# Patient Record
Sex: FEMALE | Race: WHITE | Employment: FULL TIME | URBAN - NONMETROPOLITAN AREA
[De-identification: names, ages, dates, MRNs, and addresses within clinical notes are randomized per-mention and may not be internally consistent; named-entity substitution may affect disease eponyms.]

---

## 2019-09-23 ENCOUNTER — OFFICE VISIT (OUTPATIENT)
Dept: ORTHOPEDIC SURGERY | Age: 44
End: 2019-09-23
Payer: COMMERCIAL

## 2019-09-23 VITALS
BODY MASS INDEX: 40.59 KG/M2 | HEART RATE: 79 BPM | DIASTOLIC BLOOD PRESSURE: 126 MMHG | HEIGHT: 61 IN | SYSTOLIC BLOOD PRESSURE: 192 MMHG | WEIGHT: 215 LBS

## 2019-09-23 DIAGNOSIS — M17.12 PRIMARY OSTEOARTHRITIS OF LEFT KNEE: ICD-10-CM

## 2019-09-23 DIAGNOSIS — M23.204 DEGENERATIVE TEAR OF MEDIAL MENISCUS OF LEFT KNEE: ICD-10-CM

## 2019-09-23 DIAGNOSIS — M25.561 ACUTE PAIN OF RIGHT KNEE: Primary | ICD-10-CM

## 2019-09-23 PROCEDURE — 20610 DRAIN/INJ JOINT/BURSA W/O US: CPT | Performed by: ORTHOPAEDIC SURGERY

## 2019-09-23 PROCEDURE — 99243 OFF/OP CNSLTJ NEW/EST LOW 30: CPT | Performed by: ORTHOPAEDIC SURGERY

## 2019-09-23 RX ORDER — METHYLPREDNISOLONE 4 MG/1
TABLET ORAL
Qty: 1 KIT | Refills: 0 | Status: SHIPPED | OUTPATIENT
Start: 2019-09-23 | End: 2019-09-29

## 2019-09-23 NOTE — LETTER
Range of Motion Right Left   Extension 0 -5   Flexion 120 105     Provocative Test Right Left    Positive Negative Positive Negative   Anterior drawer [] [x] [] [x]   Lachman [] [x] [] [x]   Posterior drawer [] [x] [] [x]   Varus testing [] [x] [x] []   Valgus testing [] [x] [x] []   Joint line tenderness [] [x] [x] []     Additional Exam Comments: Although her neurocirculatory lymphatic exam is normal symmetric to both lower extremities, she does have discomfort in the left leg mostly in the medial compartment with range of motion and stress. She has some crepitus in the knee and some reproduction of pain with catching and locking subjectively realized      IMAGING STUDIES    X-ray 2 views of her left knee reveals grade 2 osteoarthritis    IMPRESSION    Left knee pain secondary to osteoarthritis and medial meniscus tear degenerative    PLAN      1. Conservative care options including physical therapy, NSAIDs, bracing, and activity modification were discussed. 2.  The indications for therapeutic injections were discussed. 3.  The indications for additional imaging studies were discussed. 4.  After considering the various options discussed, the patient elected to pursue a course that includes cortisone injection left knee, Medrol Dosepak, physician directed physical therapy program, and if not substantially improved over the next 2 weeks consider scanning. She should remain off work for the next 2 weeks    Recommendation is for a cortisone injection into the left knee. After informed consent was received from the patient, the left knee was injected with 1 mL( 40mg) Depo-Medrol and 4 mL  of 0.25% Marcaine in the syringe from an anterolateral joint line approach, using a 25-gauge needle, under sterile Betadine prep, using ethyl chloride as a topical refrigerant, for a diagnosis of osteoarthritis. The patient appeared to tolerate it well. The patient should return here periodically as needed.

## 2019-09-23 NOTE — PROGRESS NOTES
Substance and Sexual Activity    Alcohol use: Not on file    Drug use: Not on file    Sexual activity: Not on file   Lifestyle    Physical activity:     Days per week: Not on file     Minutes per session: Not on file    Stress: Not on file   Relationships    Social connections:     Talks on phone: Not on file     Gets together: Not on file     Attends Baptism service: Not on file     Active member of club or organization: Not on file     Attends meetings of clubs or organizations: Not on file     Relationship status: Not on file    Intimate partner violence:     Fear of current or ex partner: Not on file     Emotionally abused: Not on file     Physically abused: Not on file     Forced sexual activity: Not on file   Other Topics Concern    Not on file   Social History Narrative    Not on file       Family HISTORY    No family history on file. PHYSICAL EXAM    Vital Signs:  BP (!) 192/126   Pulse 79   Ht 5' 1\" (1.549 m)   Wt 215 lb (97.5 kg)   BMI 40.62 kg/m²   General Appearance: Obese body habitus with a BMI of 41. Alert and oriented to person, place, and time. Affect:  Normal.   Gait: Antalgic favoring the left leg. Good balance and coordination. Skin:  Intact. Sensation:  Intact. Strength: Limited by pain  Reflexes:  Intact. Pulses:  Intact. Knee Exam:    Effusion: Negative to trace    Range of Motion Right Left   Extension 0 -5   Flexion 120 105     Provocative Test Right Left    Positive Negative Positive Negative   Anterior drawer [] [x] [] [x]   Lachman [] [x] [] [x]   Posterior drawer [] [x] [] [x]   Varus testing [] [x] [x] []   Valgus testing [] [x] [x] []   Joint line tenderness [] [x] [x] []     Additional Exam Comments: Although her neurocirculatory lymphatic exam is normal symmetric to both lower extremities, she does have discomfort in the left leg mostly in the medial compartment with range of motion and stress.   She has some crepitus in the knee and some reproduction

## 2019-10-04 ENCOUNTER — OFFICE VISIT (OUTPATIENT)
Dept: ORTHOPEDIC SURGERY | Age: 44
End: 2019-10-04
Payer: COMMERCIAL

## 2019-10-04 VITALS
HEIGHT: 61 IN | SYSTOLIC BLOOD PRESSURE: 159 MMHG | BODY MASS INDEX: 39.65 KG/M2 | WEIGHT: 210 LBS | HEART RATE: 62 BPM | DIASTOLIC BLOOD PRESSURE: 89 MMHG

## 2019-10-04 DIAGNOSIS — M17.12 PRIMARY OSTEOARTHRITIS OF LEFT KNEE: ICD-10-CM

## 2019-10-04 DIAGNOSIS — M23.204 DEGENERATIVE TEAR OF MEDIAL MENISCUS OF LEFT KNEE: Primary | ICD-10-CM

## 2019-10-04 PROCEDURE — 99213 OFFICE O/P EST LOW 20 MIN: CPT | Performed by: ORTHOPAEDIC SURGERY

## 2019-10-30 ENCOUNTER — OFFICE VISIT (OUTPATIENT)
Dept: ORTHOPEDIC SURGERY | Age: 44
End: 2019-10-30
Payer: COMMERCIAL

## 2019-10-30 DIAGNOSIS — M17.12 PRIMARY OSTEOARTHRITIS OF LEFT KNEE: Primary | ICD-10-CM

## 2019-10-30 DIAGNOSIS — M23.52: ICD-10-CM

## 2019-10-30 DIAGNOSIS — M22.42 CHONDROMALACIA PATELLAE OF LEFT KNEE: ICD-10-CM

## 2019-10-30 PROCEDURE — 99213 OFFICE O/P EST LOW 20 MIN: CPT | Performed by: ORTHOPAEDIC SURGERY

## 2019-10-31 ENCOUNTER — TELEPHONE (OUTPATIENT)
Dept: ORTHOPEDIC SURGERY | Age: 44
End: 2019-10-31

## 2019-11-15 ENCOUNTER — TELEPHONE (OUTPATIENT)
Dept: ORTHOPEDIC SURGERY | Age: 44
End: 2019-11-15

## 2019-11-21 ENCOUNTER — ANESTHESIA EVENT (OUTPATIENT)
Dept: OPERATING ROOM | Age: 44
End: 2019-11-21
Payer: COMMERCIAL

## 2019-11-22 ENCOUNTER — HOSPITAL ENCOUNTER (OUTPATIENT)
Age: 44
Setting detail: OUTPATIENT SURGERY
Discharge: HOME | End: 2019-11-22
Attending: ORTHOPAEDIC SURGERY | Admitting: ORTHOPAEDIC SURGERY
Payer: COMMERCIAL

## 2019-11-22 ENCOUNTER — ANESTHESIA (OUTPATIENT)
Dept: OPERATING ROOM | Age: 44
End: 2019-11-22
Payer: COMMERCIAL

## 2019-11-22 VITALS
HEART RATE: 70 BPM | BODY MASS INDEX: 40.48 KG/M2 | OXYGEN SATURATION: 98 % | RESPIRATION RATE: 14 BRPM | SYSTOLIC BLOOD PRESSURE: 170 MMHG | DIASTOLIC BLOOD PRESSURE: 85 MMHG | TEMPERATURE: 97.3 F | HEIGHT: 62 IN | WEIGHT: 220 LBS

## 2019-11-22 VITALS
DIASTOLIC BLOOD PRESSURE: 63 MMHG | SYSTOLIC BLOOD PRESSURE: 124 MMHG | OXYGEN SATURATION: 100 % | RESPIRATION RATE: 14 BRPM

## 2019-11-22 DIAGNOSIS — M25.561 ACUTE PAIN OF RIGHT KNEE: Primary | ICD-10-CM

## 2019-11-22 DIAGNOSIS — M22.42 CHONDROMALACIA PATELLAE OF LEFT KNEE: ICD-10-CM

## 2019-11-22 DIAGNOSIS — M23.52: ICD-10-CM

## 2019-11-22 DIAGNOSIS — M17.12 PRIMARY OSTEOARTHRITIS OF LEFT KNEE: ICD-10-CM

## 2019-11-22 PROCEDURE — 2709999900 HC NON-CHARGEABLE SUPPLY: Performed by: ORTHOPAEDIC SURGERY

## 2019-11-22 PROCEDURE — 6360000002 HC RX W HCPCS: Performed by: ORTHOPAEDIC SURGERY

## 2019-11-22 PROCEDURE — 2500000003 HC RX 250 WO HCPCS: Performed by: ORTHOPAEDIC SURGERY

## 2019-11-22 PROCEDURE — 2580000003 HC RX 258: Performed by: ANESTHESIOLOGY

## 2019-11-22 PROCEDURE — 2500000003 HC RX 250 WO HCPCS: Performed by: ANESTHESIOLOGY

## 2019-11-22 PROCEDURE — 3700000000 HC ANESTHESIA ATTENDED CARE: Performed by: ORTHOPAEDIC SURGERY

## 2019-11-22 PROCEDURE — 7100000001 HC PACU RECOVERY - ADDTL 15 MIN: Performed by: ORTHOPAEDIC SURGERY

## 2019-11-22 PROCEDURE — 7100000000 HC PACU RECOVERY - FIRST 15 MIN: Performed by: ORTHOPAEDIC SURGERY

## 2019-11-22 PROCEDURE — 7100000010 HC PHASE II RECOVERY - FIRST 15 MIN: Performed by: ORTHOPAEDIC SURGERY

## 2019-11-22 PROCEDURE — 6360000002 HC RX W HCPCS: Performed by: NURSE ANESTHETIST, CERTIFIED REGISTERED

## 2019-11-22 PROCEDURE — 3600000004 HC SURGERY LEVEL 4 BASE: Performed by: ORTHOPAEDIC SURGERY

## 2019-11-22 PROCEDURE — 3600000014 HC SURGERY LEVEL 4 ADDTL 15MIN: Performed by: ORTHOPAEDIC SURGERY

## 2019-11-22 PROCEDURE — 2500000003 HC RX 250 WO HCPCS: Performed by: NURSE ANESTHETIST, CERTIFIED REGISTERED

## 2019-11-22 PROCEDURE — 3700000001 HC ADD 15 MINUTES (ANESTHESIA): Performed by: ORTHOPAEDIC SURGERY

## 2019-11-22 PROCEDURE — 6360000002 HC RX W HCPCS: Performed by: ANESTHESIOLOGY

## 2019-11-22 PROCEDURE — 7100000011 HC PHASE II RECOVERY - ADDTL 15 MIN: Performed by: ORTHOPAEDIC SURGERY

## 2019-11-22 RX ORDER — LIDOCAINE HYDROCHLORIDE 10 MG/ML
0.3 INJECTION, SOLUTION EPIDURAL; INFILTRATION; INTRACAUDAL; PERINEURAL
Status: COMPLETED | OUTPATIENT
Start: 2019-11-22 | End: 2019-11-22

## 2019-11-22 RX ORDER — OXYCODONE HYDROCHLORIDE AND ACETAMINOPHEN 5; 325 MG/1; MG/1
2 TABLET ORAL PRN
Status: DISCONTINUED | OUTPATIENT
Start: 2019-11-22 | End: 2019-11-22 | Stop reason: HOSPADM

## 2019-11-22 RX ORDER — BUPIVACAINE HYDROCHLORIDE 2.5 MG/ML
INJECTION, SOLUTION INFILTRATION; PERINEURAL PRN
Status: DISCONTINUED | OUTPATIENT
Start: 2019-11-22 | End: 2019-11-22 | Stop reason: ALTCHOICE

## 2019-11-22 RX ORDER — SODIUM CHLORIDE 0.9 % (FLUSH) 0.9 %
10 SYRINGE (ML) INJECTION PRN
Status: DISCONTINUED | OUTPATIENT
Start: 2019-11-22 | End: 2019-11-22 | Stop reason: HOSPADM

## 2019-11-22 RX ORDER — HYDROCODONE BITARTRATE AND ACETAMINOPHEN 5; 325 MG/1; MG/1
1 TABLET ORAL EVERY 6 HOURS PRN
Qty: 28 TABLET | Refills: 0 | Status: SHIPPED | OUTPATIENT
Start: 2019-11-22 | End: 2019-11-29

## 2019-11-22 RX ORDER — HYDROMORPHONE HCL 110MG/55ML
PATIENT CONTROLLED ANALGESIA SYRINGE INTRAVENOUS PRN
Status: DISCONTINUED | OUTPATIENT
Start: 2019-11-22 | End: 2019-11-22 | Stop reason: SDUPTHER

## 2019-11-22 RX ORDER — BUPIVACAINE HYDROCHLORIDE 2.5 MG/ML
INJECTION, SOLUTION EPIDURAL; INFILTRATION; INTRACAUDAL
Status: DISCONTINUED
Start: 2019-11-22 | End: 2019-11-22 | Stop reason: HOSPADM

## 2019-11-22 RX ORDER — ACETAMINOPHEN 10 MG/ML
1000 INJECTION, SOLUTION INTRAVENOUS ONCE
Status: COMPLETED | OUTPATIENT
Start: 2019-11-22 | End: 2019-11-22

## 2019-11-22 RX ORDER — SODIUM CHLORIDE 0.9 % (FLUSH) 0.9 %
10 SYRINGE (ML) INJECTION EVERY 12 HOURS SCHEDULED
Status: DISCONTINUED | OUTPATIENT
Start: 2019-11-22 | End: 2019-11-22 | Stop reason: HOSPADM

## 2019-11-22 RX ORDER — LABETALOL HYDROCHLORIDE 5 MG/ML
5 INJECTION, SOLUTION INTRAVENOUS EVERY 10 MIN PRN
Status: DISCONTINUED | OUTPATIENT
Start: 2019-11-22 | End: 2019-11-22 | Stop reason: HOSPADM

## 2019-11-22 RX ORDER — ONDANSETRON 2 MG/ML
INJECTION INTRAMUSCULAR; INTRAVENOUS PRN
Status: DISCONTINUED | OUTPATIENT
Start: 2019-11-22 | End: 2019-11-22 | Stop reason: SDUPTHER

## 2019-11-22 RX ORDER — ONDANSETRON 2 MG/ML
4 INJECTION INTRAMUSCULAR; INTRAVENOUS EVERY 10 MIN PRN
Status: DISCONTINUED | OUTPATIENT
Start: 2019-11-22 | End: 2019-11-22 | Stop reason: HOSPADM

## 2019-11-22 RX ORDER — DEXAMETHASONE SODIUM PHOSPHATE 4 MG/ML
INJECTION, SOLUTION INTRA-ARTICULAR; INTRALESIONAL; INTRAMUSCULAR; INTRAVENOUS; SOFT TISSUE PRN
Status: DISCONTINUED | OUTPATIENT
Start: 2019-11-22 | End: 2019-11-22 | Stop reason: SDUPTHER

## 2019-11-22 RX ORDER — LIDOCAINE HYDROCHLORIDE 20 MG/ML
INJECTION, SOLUTION INFILTRATION; PERINEURAL PRN
Status: DISCONTINUED | OUTPATIENT
Start: 2019-11-22 | End: 2019-11-22 | Stop reason: SDUPTHER

## 2019-11-22 RX ORDER — PROPOFOL 10 MG/ML
INJECTION, EMULSION INTRAVENOUS PRN
Status: DISCONTINUED | OUTPATIENT
Start: 2019-11-22 | End: 2019-11-22 | Stop reason: SDUPTHER

## 2019-11-22 RX ORDER — SODIUM CHLORIDE, SODIUM LACTATE, POTASSIUM CHLORIDE, CALCIUM CHLORIDE 600; 310; 30; 20 MG/100ML; MG/100ML; MG/100ML; MG/100ML
INJECTION, SOLUTION INTRAVENOUS CONTINUOUS
Status: DISCONTINUED | OUTPATIENT
Start: 2019-11-22 | End: 2019-11-22 | Stop reason: HOSPADM

## 2019-11-22 RX ORDER — HYDRALAZINE HYDROCHLORIDE 20 MG/ML
5 INJECTION INTRAMUSCULAR; INTRAVENOUS EVERY 10 MIN PRN
Status: DISCONTINUED | OUTPATIENT
Start: 2019-11-22 | End: 2019-11-22 | Stop reason: HOSPADM

## 2019-11-22 RX ORDER — OXYCODONE HYDROCHLORIDE AND ACETAMINOPHEN 5; 325 MG/1; MG/1
1 TABLET ORAL PRN
Status: DISCONTINUED | OUTPATIENT
Start: 2019-11-22 | End: 2019-11-22 | Stop reason: HOSPADM

## 2019-11-22 RX ORDER — FENTANYL CITRATE 50 UG/ML
INJECTION, SOLUTION INTRAMUSCULAR; INTRAVENOUS PRN
Status: DISCONTINUED | OUTPATIENT
Start: 2019-11-22 | End: 2019-11-22 | Stop reason: SDUPTHER

## 2019-11-22 RX ADMIN — HYDROMORPHONE HYDROCHLORIDE 1 MG: 2 INJECTION, SOLUTION INTRAMUSCULAR; INTRAVENOUS; SUBCUTANEOUS at 11:08

## 2019-11-22 RX ADMIN — FENTANYL CITRATE 25 MCG: 50 INJECTION INTRAMUSCULAR; INTRAVENOUS at 11:00

## 2019-11-22 RX ADMIN — Medication 2 G: at 10:30

## 2019-11-22 RX ADMIN — FENTANYL CITRATE 25 MCG: 50 INJECTION INTRAMUSCULAR; INTRAVENOUS at 10:59

## 2019-11-22 RX ADMIN — ONDANSETRON 4 MG: 2 INJECTION, SOLUTION INTRAMUSCULAR; INTRAVENOUS at 10:48

## 2019-11-22 RX ADMIN — LABETALOL HYDROCHLORIDE 5 MG: 5 INJECTION INTRAVENOUS at 11:56

## 2019-11-22 RX ADMIN — FENTANYL CITRATE 50 MCG: 50 INJECTION INTRAMUSCULAR; INTRAVENOUS at 10:56

## 2019-11-22 RX ADMIN — PROPOFOL 200 MG: 10 INJECTION, EMULSION INTRAVENOUS at 10:51

## 2019-11-22 RX ADMIN — LABETALOL HYDROCHLORIDE 5 MG: 5 INJECTION INTRAVENOUS at 11:45

## 2019-11-22 RX ADMIN — HYDROMORPHONE HYDROCHLORIDE 0.5 MG: 1 INJECTION, SOLUTION INTRAMUSCULAR; INTRAVENOUS; SUBCUTANEOUS at 11:31

## 2019-11-22 RX ADMIN — DEXAMETHASONE SODIUM PHOSPHATE 10 MG: 4 INJECTION, SOLUTION INTRAMUSCULAR; INTRAVENOUS at 10:56

## 2019-11-22 RX ADMIN — LIDOCAINE HYDROCHLORIDE 0.3 ML: 10 INJECTION, SOLUTION EPIDURAL; INFILTRATION; INTRACAUDAL; PERINEURAL at 10:25

## 2019-11-22 RX ADMIN — SODIUM CHLORIDE, POTASSIUM CHLORIDE, SODIUM LACTATE AND CALCIUM CHLORIDE: 600; 310; 30; 20 INJECTION, SOLUTION INTRAVENOUS at 10:48

## 2019-11-22 RX ADMIN — SODIUM CHLORIDE, POTASSIUM CHLORIDE, SODIUM LACTATE AND CALCIUM CHLORIDE: 600; 310; 30; 20 INJECTION, SOLUTION INTRAVENOUS at 10:25

## 2019-11-22 RX ADMIN — Medication 2 G: at 10:56

## 2019-11-22 RX ADMIN — LIDOCAINE HYDROCHLORIDE 100 MG: 20 INJECTION, SOLUTION INFILTRATION; PERINEURAL at 10:51

## 2019-11-22 RX ADMIN — PROPOFOL 50 MG: 10 INJECTION, EMULSION INTRAVENOUS at 11:07

## 2019-11-22 RX ADMIN — PROPOFOL 50 MG: 10 INJECTION, EMULSION INTRAVENOUS at 10:53

## 2019-11-22 RX ADMIN — ACETAMINOPHEN 1000 MG: 10 INJECTION, SOLUTION INTRAVENOUS at 10:25

## 2019-11-22 ASSESSMENT — PULMONARY FUNCTION TESTS
PIF_VALUE: 5
PIF_VALUE: 5
PIF_VALUE: 3
PIF_VALUE: 7
PIF_VALUE: 6
PIF_VALUE: 4
PIF_VALUE: 5
PIF_VALUE: 4
PIF_VALUE: 4
PIF_VALUE: 6
PIF_VALUE: 15
PIF_VALUE: 4
PIF_VALUE: 4
PIF_VALUE: 17
PIF_VALUE: 4
PIF_VALUE: 6
PIF_VALUE: 4
PIF_VALUE: 7
PIF_VALUE: 17
PIF_VALUE: 4
PIF_VALUE: 6
PIF_VALUE: 7
PIF_VALUE: 1
PIF_VALUE: 13
PIF_VALUE: 5
PIF_VALUE: 2
PIF_VALUE: 16
PIF_VALUE: 3

## 2019-11-22 ASSESSMENT — PAIN DESCRIPTION - PAIN TYPE
TYPE: SURGICAL PAIN

## 2019-11-22 ASSESSMENT — PAIN DESCRIPTION - LOCATION
LOCATION: KNEE

## 2019-11-22 ASSESSMENT — PAIN - FUNCTIONAL ASSESSMENT
PAIN_FUNCTIONAL_ASSESSMENT: 0-10
PAIN_FUNCTIONAL_ASSESSMENT: PREVENTS OR INTERFERES WITH MANY ACTIVE NOT PASSIVE ACTIVITIES

## 2019-11-22 ASSESSMENT — PAIN DESCRIPTION - DESCRIPTORS: DESCRIPTORS: BURNING

## 2019-11-22 ASSESSMENT — PAIN DESCRIPTION - ORIENTATION
ORIENTATION: LEFT

## 2019-11-22 ASSESSMENT — PAIN SCALES - GENERAL
PAINLEVEL_OUTOF10: 5
PAINLEVEL_OUTOF10: 7
PAINLEVEL_OUTOF10: 3
PAINLEVEL_OUTOF10: 5

## 2019-11-27 ENCOUNTER — OFFICE VISIT (OUTPATIENT)
Dept: ORTHOPEDIC SURGERY | Age: 44
End: 2019-11-27

## 2019-11-27 VITALS — BODY MASS INDEX: 40.49 KG/M2 | WEIGHT: 220.02 LBS | HEIGHT: 62 IN | RESPIRATION RATE: 12 BRPM

## 2019-11-27 DIAGNOSIS — M22.42 CHONDROMALACIA PATELLAE OF LEFT KNEE: Primary | ICD-10-CM

## 2019-11-27 PROCEDURE — 99024 POSTOP FOLLOW-UP VISIT: CPT | Performed by: ORTHOPAEDIC SURGERY

## 2019-12-09 ENCOUNTER — TELEPHONE (OUTPATIENT)
Dept: ORTHOPEDIC SURGERY | Age: 44
End: 2019-12-09

## 2022-03-24 ENCOUNTER — OFFICE VISIT (OUTPATIENT)
Dept: ORTHOPEDIC SURGERY | Age: 47
End: 2022-03-24
Payer: COMMERCIAL

## 2022-03-24 VITALS — WEIGHT: 220 LBS | HEIGHT: 62 IN | BODY MASS INDEX: 40.48 KG/M2

## 2022-03-24 DIAGNOSIS — M25.562 LEFT KNEE PAIN, UNSPECIFIED CHRONICITY: ICD-10-CM

## 2022-03-24 DIAGNOSIS — M94.262 CHONDROMALACIA OF LEFT KNEE: Primary | ICD-10-CM

## 2022-03-24 PROCEDURE — 99213 OFFICE O/P EST LOW 20 MIN: CPT | Performed by: ORTHOPAEDIC SURGERY

## 2022-03-24 PROCEDURE — 20610 DRAIN/INJ JOINT/BURSA W/O US: CPT | Performed by: ORTHOPAEDIC SURGERY

## 2022-03-24 RX ORDER — BUPIVACAINE HYDROCHLORIDE 2.5 MG/ML
12 INJECTION, SOLUTION INFILTRATION; PERINEURAL ONCE
Status: COMPLETED | OUTPATIENT
Start: 2022-03-24 | End: 2022-03-24

## 2022-03-24 RX ORDER — MELOXICAM 15 MG/1
15 TABLET ORAL DAILY
Qty: 30 TABLET | Refills: 3 | Status: SHIPPED | OUTPATIENT
Start: 2022-03-24

## 2022-03-24 RX ORDER — TRIAMCINOLONE ACETONIDE 40 MG/ML
40 INJECTION, SUSPENSION INTRA-ARTICULAR; INTRAMUSCULAR ONCE
Status: COMPLETED | OUTPATIENT
Start: 2022-03-24 | End: 2022-03-24

## 2022-03-24 RX ADMIN — BUPIVACAINE HYDROCHLORIDE 30 MG: 2.5 INJECTION, SOLUTION INFILTRATION; PERINEURAL at 14:59

## 2022-03-24 RX ADMIN — TRIAMCINOLONE ACETONIDE 40 MG: 40 INJECTION, SUSPENSION INTRA-ARTICULAR; INTRAMUSCULAR at 15:00

## 2022-03-24 NOTE — PROGRESS NOTES
KNEE VISIT      HISTORY OF PRESENT ILLNESS    Ray Kimball is a 56 y.o. female who presents for evaluation of left knee pain. She had surgery back in 2019 for chondroplasty of the patella. She had disappointing those been having some locking and weakness is a grade 7/10 where she has sharp and achy pain. She still working in [employer] and when she has to walk long distances or do heavy lifting she has more instability and fear of collapse. She cannot recall any specific history of injury. ROS    Well-documented patient history form dated 3/24/2022  All other ROS negative except for above. Past Surgical history    Past Surgical History:   Procedure Laterality Date    HYSTERECTOMY      KNEE ARTHROSCOPY Left 11/22/2019    LEFT KNEE VIDEO ARTHROSCOPY, CHONDROPLASTY OF PATELLA, ANTERIOR CRUCIATE LIGAMENT DEBRIDEMENT performed by Giselle Espinosa MD at [address] Left 11/22/2019    knee arthroscopy, chodrplasty of patella, debridement of ACL    TUBAL LIGATION         PAST MEDICAL    Past Medical History:   Diagnosis Date    Hypertension     Migraines        Allergies    Allergies   Allergen Reactions    Imitrex [Sumatriptan] Shortness Of Breath and Palpitations       Meds    Current Outpatient Medications   Medication Sig Dispense Refill    meloxicam (MOBIC) 15 MG tablet Take 1 tablet by mouth daily 30 tablet 3    lisinopril (PRINIVIL;ZESTRIL) 10 MG tablet Take 10 mg by mouth daily      buPROPion (WELLBUTRIN SR) 100 MG extended release tablet Take 100 mg by mouth 2 times daily      amitriptyline (ELAVIL) 10 MG tablet Take 10 mg by mouth nightly       No current facility-administered medications for this visit.        Social    Social History     Socioeconomic History    Marital status: Life Partner     Spouse name: Not on file    Number of children: Not on file    Years of education: Not on file    Highest education level: Not on file   Occupational History    Not on file Tobacco Use    Smoking status: Never Smoker    Smokeless tobacco: Never Used   Substance and Sexual Activity    Alcohol use: Not on file    Drug use: Not Currently    Sexual activity: Not on file   Other Topics Concern    Not on file   Social History Narrative    Not on file     Social Determinants of Health     Financial Resource Strain:     Difficulty of Paying Living Expenses: Not on file   Food Insecurity:     Worried About Running Out of Food in the Last Year: Not on file    Stephani of Food in the Last Year: Not on file   Transportation Needs:     Lack of Transportation (Medical): Not on file    Lack of Transportation (Non-Medical): Not on file   Physical Activity:     Days of Exercise per Week: Not on file    Minutes of Exercise per Session: Not on file   Stress:     Feeling of Stress : Not on file   Social Connections:     Frequency of Communication with Friends and Family: Not on file    Frequency of Social Gatherings with Friends and Family: Not on file    Attends Scientologist Services: Not on file    Active Member of [address] [place] [address] [employer] or Organizations: Not on file    Attends Club or Organization Meetings: Not on file    Marital Status: Not on file   Intimate Partner Violence:     Fear of Current or Ex-Partner: Not on file    Emotionally Abused: Not on file    Physically Abused: Not on file    Sexually Abused: Not on file   Housing Stability:     Unable to Pay for Housing in the Last Year: Not on file    Number of [place] in the Last Year: Not on file    Unstable Housing in the Last Year: Not on file       Family HISTORY    No family history on file. PHYSICAL EXAM    Vital Signs:  Ht 5' 2\" (1.575 m)   Wt 220 lb (99.8 kg)   BMI 40.24 kg/m²   General Appearance:  Normal body habitus. Alert and oriented to person, place, and time. Affect:  Normal.   Gait:  Normal. Good balance and coordination. Skin:  Intact. Sensation:  Intact. Strength:  Intact. Reflexes:  Intact.    Pulses: Intact. Knee Exam:    Effusion: Negative    Range of Motion Right Left   Extension 0 0   Flexion 115 105     Provocative Test Right Left    Positive Negative Positive Negative   Anterior drawer [] [] [] []   Lachman [] [] [] []   Posterior drawer [] [] [] []   Varus testing [] [x] [] [x]   Valgus testing [] [x] [x] []   Joint line tenderness [] [x] [x] []     Additional Exam Comments: Her neurocirculatory lymphatic exam otherwise is normal symmetric to both lower extremities. She has no effusion but she does have generalized pain mostly to varus and valgus stress and some patellofemoral crepitus which is symptomatic throughout range of motion. IMAGING STUDIES    X-rays 3 views left knee is unremarkable for acute or chronic pathology with exception of some mild arthritic change    IMPRESSION    Left knee pain secondary to chondromalacia patella recurrent with possible arthritic change    PLAN       1. Conservative care options including physical therapy, NSAIDs, bracing, and activity modification were discussed. 2.  The indications for therapeutic injections were discussed. 3.  The indications for additional imaging studies were discussed. 4.  After considering the various options discussed, the patient elected to pursue a course that includes cortisone injection left knee, and aggressive physician directed therapy program, and job limitations for lifting over the next 6 weeks. If her problem does not substantial improve she will likely benefit from scanning. Recommendation is for a cortisone injection into the left knee. After informed consent was received from the patient, the left knee was injected with 1 mL( 40mg) Kenalog and 4 mL  of 0.25% Marcaine in the syringe from an anterolateral joint line approach, using a 25-gauge needle, under sterile Betadine prep, using ethyl chloride as a topical refrigerant, for a diagnosis of osteoarthritis. The patient appeared to tolerate it well.    The patient should return here periodically as needed. Encounter Diagnoses   Name Primary?     Left knee pain, unspecified chronicity     Chondromalacia of left knee Yes        Orders Placed This Encounter   Procedures    XR KNEE LEFT (3 VIEWS)     Standing Status:   Future     Number of Occurrences:   1     Standing Expiration Date:   3/25/2022     Order Specific Question:   Reason for exam:     Answer:   pain    MA ARTHROCENTESIS ASPIR&/INJ MAJOR JT/BURSA W/O US

## 2022-03-24 NOTE — LETTER
[address] [address]  Phone: [phone]  Fax: [phone]    Anabela Wood MD        March 24, 2022     Patient: Ramandeep Paiz   YOB: 1975   Date of Visit: 3/24/2022       To Whom It May Concern: It is my medical opinion that Ramandeep Paiz may return to work on 03/25/2022 with the following restrictions: lifting/carrying not to exceed 25 lbs. .    If you have any questions or concerns, please don't hesitate to call.     Sincerely,          Anabela Wood MD

## 2022-04-08 DIAGNOSIS — G89.29 CHRONIC PAIN OF LEFT KNEE: Primary | ICD-10-CM

## 2022-04-08 DIAGNOSIS — M94.262 CHONDROMALACIA OF LEFT KNEE: ICD-10-CM

## 2022-04-08 DIAGNOSIS — M23.52: ICD-10-CM

## 2022-04-08 DIAGNOSIS — M22.42 CHONDROMALACIA PATELLAE OF LEFT KNEE: ICD-10-CM

## 2022-04-08 DIAGNOSIS — M25.562 CHRONIC PAIN OF LEFT KNEE: Primary | ICD-10-CM

## 2022-04-11 ENCOUNTER — TELEPHONE (OUTPATIENT)
Dept: ORTHOPEDIC SURGERY | Age: 47
End: 2022-04-11

## 2022-05-02 ENCOUNTER — TELEPHONE (OUTPATIENT)
Dept: ORTHOPEDIC SURGERY | Age: 47
End: 2022-05-02

## 2022-05-02 NOTE — TELEPHONE ENCOUNTER
DR RUIZ REVIEWED PATIENT'S MRI RESULTS AND IS REFERRING TO DR Edwar Dixon. UNABLE TO LEAVE MESSAGE D/T MAILBOX FULL. REFERRAL ORDER HAS BEEN ENTERED.

## 2022-05-05 ENCOUNTER — OFFICE VISIT (OUTPATIENT)
Dept: ORTHOPEDIC SURGERY | Age: 47
End: 2022-05-05
Payer: COMMERCIAL

## 2022-05-05 DIAGNOSIS — M22.42 CHONDROMALACIA PATELLAE OF LEFT KNEE: Primary | ICD-10-CM

## 2022-05-05 PROCEDURE — 99212 OFFICE O/P EST SF 10 MIN: CPT | Performed by: ORTHOPAEDIC SURGERY

## 2022-05-05 NOTE — LETTER
[address] [place] [address]  Phone: [phone]  Fax: [phone]    Max Fleming MD        May 5, 2022     Patient: Lamine Ansari   YOB: 1975   Date of Visit: 5/5/2022       To Whom It May Concern: It is my medical opinion that Lamine Ansari should remain out of work until 06/16/2022. If you have any questions or concerns, please don't hesitate to call.     Sincerely,        Max Fleming MD

## 2022-05-05 NOTE — PROGRESS NOTES
She returns today for evaluation of her left knee. She has had an MRI and is here for review. She says she is having increasingly difficult time with her job because of the catching and locking in her left knee and the pain in the activities that revolve around her work with prolonged standing twisting steps etc.  At this time I would recommend proceeding with a left knee video arthroscopy for the MRI which reveals recurrent chondromalacia patella and take her off work in the interim. She may benefit from viscosupplementation postoperatively. The patient was counseled at length about the risks of alverto Covid-19 during their perioperative period and any recovery window from their procedure. The patient was made aware that alverto Covid-19  may worsen their prognosis for recovering from their procedure  and lend to a higher morbidity and/or mortality risk. All material risks, benefits, and reasonable alternatives including postponing the procedure were discussed. The patient does wish to proceed with the procedure at this time. INFORMED CONSENT NOTE        We discussed the risks, benefits, and alternatives to the proposed procedure, as well as the necessity of other members of the healthcare team participating in the procedure. All questions were answered and the patient elected to proceed with the proposed procedure and signed the informed consent form.

## 2022-05-06 ENCOUNTER — TELEPHONE (OUTPATIENT)
Dept: ORTHOPEDIC SURGERY | Age: 47
End: 2022-05-06

## 2022-05-13 ENCOUNTER — TELEPHONE (OUTPATIENT)
Dept: ORTHOPEDIC SURGERY | Age: 47
End: 2022-05-13

## 2022-05-19 NOTE — TELEPHONE ENCOUNTER
Auth: # [id number]    Date: 06/10/22 thru 12/10/22  Type of SX:  Outpatient  Location: [facility]  CPT: 77162   DX Code: M22.42  SX area: Lt knee  Insurance: [employer]

## 2022-06-03 NOTE — PROGRESS NOTES
1.  Do not eat or drink anything after 12 midnight prior to surgery. This includes no water, chewing gum or mints. 2.  Take the following pills with a small sip of water on the morning of surgery . 3. Aspirin, Ibuprofen, Advil, Naproxen, Vitamin E and other Anti-inflammatory products should be stopped for 5 days before surgery or as directed by your physician. 4.  Check with your doctor regarding stopping Plavix, Coumadin, Lovenox, Fragmin or other blood thinners. 5.  Do not smoke and do not drink alcoholic beverages 24 hours prior to surgery. This includes NA Beer. 6.  You may brush your teeth and gargle the morning of surgery. DO NOT SWALLOW WATER.  7.  You MUST make arrangements for a responsible adult to take you home after your surgery. You will not be allowed to leave alone or drive yourself home. It is strongly suggested someone stay with you the first 24 hours. Your surgery will be cancelled if you do not have a ride home. 8.  A parent/legal guardian must accompany a child scheduled for surgery and plan to stay at the hospital until the child is discharged. Please do not bring other children with you. 9.  Please wear simple, loose fitting clothing to the hospital.  Melany Garnett not bring valuables ( money, credit cards, checkbooks, etc.)  Do not wear any makeup (including no eye makeup) or nail polish on your fingers or toes. 10.  Do not wear any jewelry or piercing on the day of surgery. All body piercing jewelry must be removed. 11.  If you have dentures, they will be removed before going to the OR; we will provide you a container. If you wear contact lenses or glasses, they will be removed; please bring a case for them. 12.  Please see your family doctor/pediatrician for a history & physical and/or concerning medications. Bring any test results/reports from your physician's office the day of surgery. PCP   Phone     H&P appt date   15.   Remember to bring Blood Bank Bracelet to the hospital on the day of surgery. 14.  If you have a Living Will and Durable Power of  for Healthcare, please bring in a copy. 13.  Notify your Surgeon if you develop any illness between now and surgery time; cough, cold, fever, sore throat, nausea, vomiting, etc.  Please notify your surgeon if you experience dizziness, shortness of breath or blurred vision between now and the time of your surgery. 16.  DO NOT shave your operative site 96 hours (4 days) prior to surgery. For face and neck surgery, men may use an electric razor 48 hours (2 days) prior to surgery. 17. Shower the night before surgery and the morning of surgery with   Antibacterial soap   Hibiclens or  Chlorhexidine gluconate. To provide excellent care, visitors will be limited to two in a room at any given time. Please no children under the age of 15 in the surgical department.

## 2022-06-09 ENCOUNTER — ANESTHESIA EVENT (OUTPATIENT)
Dept: OPERATING ROOM | Age: 47
End: 2022-06-09
Payer: COMMERCIAL

## 2022-06-10 ENCOUNTER — ANESTHESIA (OUTPATIENT)
Dept: OPERATING ROOM | Age: 47
End: 2022-06-10
Payer: COMMERCIAL

## 2022-06-10 ENCOUNTER — HOSPITAL ENCOUNTER (OUTPATIENT)
Age: 47
Setting detail: OUTPATIENT SURGERY
Discharge: HOME | End: 2022-06-10
Attending: ORTHOPAEDIC SURGERY | Admitting: ORTHOPAEDIC SURGERY
Payer: COMMERCIAL

## 2022-06-10 VITALS
SYSTOLIC BLOOD PRESSURE: 178 MMHG | RESPIRATION RATE: 12 BRPM | WEIGHT: 225 LBS | HEART RATE: 90 BPM | BODY MASS INDEX: 41.41 KG/M2 | DIASTOLIC BLOOD PRESSURE: 77 MMHG | OXYGEN SATURATION: 96 % | HEIGHT: 62 IN | TEMPERATURE: 98 F

## 2022-06-10 DIAGNOSIS — M22.42 CHONDROMALACIA PATELLAE OF LEFT KNEE: ICD-10-CM

## 2022-06-10 DIAGNOSIS — M25.562 ACUTE PAIN OF LEFT KNEE: Primary | ICD-10-CM

## 2022-06-10 PROCEDURE — 6360000002 HC RX W HCPCS: Performed by: ORTHOPAEDIC SURGERY

## 2022-06-10 PROCEDURE — 3700000001 HC ADD 15 MINUTES (ANESTHESIA): Performed by: ORTHOPAEDIC SURGERY

## 2022-06-10 PROCEDURE — 7100000011 HC PHASE II RECOVERY - ADDTL 15 MIN: Performed by: ORTHOPAEDIC SURGERY

## 2022-06-10 PROCEDURE — 3600000014 HC SURGERY LEVEL 4 ADDTL 15MIN: Performed by: ORTHOPAEDIC SURGERY

## 2022-06-10 PROCEDURE — 2500000003 HC RX 250 WO HCPCS: Performed by: ORTHOPAEDIC SURGERY

## 2022-06-10 PROCEDURE — 2709999900 HC NON-CHARGEABLE SUPPLY: Performed by: ORTHOPAEDIC SURGERY

## 2022-06-10 PROCEDURE — 6360000002 HC RX W HCPCS: Performed by: NURSE ANESTHETIST, CERTIFIED REGISTERED

## 2022-06-10 PROCEDURE — 3700000000 HC ANESTHESIA ATTENDED CARE: Performed by: ORTHOPAEDIC SURGERY

## 2022-06-10 PROCEDURE — 6360000002 HC RX W HCPCS: Performed by: ANESTHESIOLOGY

## 2022-06-10 PROCEDURE — 2500000003 HC RX 250 WO HCPCS: Performed by: ANESTHESIOLOGY

## 2022-06-10 PROCEDURE — 7100000010 HC PHASE II RECOVERY - FIRST 15 MIN: Performed by: ORTHOPAEDIC SURGERY

## 2022-06-10 PROCEDURE — 3600000004 HC SURGERY LEVEL 4 BASE: Performed by: ORTHOPAEDIC SURGERY

## 2022-06-10 PROCEDURE — 6370000000 HC RX 637 (ALT 250 FOR IP): Performed by: NURSE ANESTHETIST, CERTIFIED REGISTERED

## 2022-06-10 PROCEDURE — 2500000003 HC RX 250 WO HCPCS: Performed by: NURSE ANESTHETIST, CERTIFIED REGISTERED

## 2022-06-10 PROCEDURE — 2720000010 HC SURG SUPPLY STERILE: Performed by: ORTHOPAEDIC SURGERY

## 2022-06-10 PROCEDURE — 2580000003 HC RX 258: Performed by: ANESTHESIOLOGY

## 2022-06-10 PROCEDURE — 6370000000 HC RX 637 (ALT 250 FOR IP): Performed by: ANESTHESIOLOGY

## 2022-06-10 PROCEDURE — 7100000000 HC PACU RECOVERY - FIRST 15 MIN: Performed by: ORTHOPAEDIC SURGERY

## 2022-06-10 PROCEDURE — 7100000001 HC PACU RECOVERY - ADDTL 15 MIN: Performed by: ORTHOPAEDIC SURGERY

## 2022-06-10 PROCEDURE — 29877 ARTHRS KNEE SURG DBRDMT/SHVG: CPT | Performed by: ORTHOPAEDIC SURGERY

## 2022-06-10 RX ORDER — LIDOCAINE HYDROCHLORIDE 10 MG/ML
0.3 INJECTION, SOLUTION EPIDURAL; INFILTRATION; INTRACAUDAL; PERINEURAL
Status: COMPLETED | OUTPATIENT
Start: 2022-06-10 | End: 2022-06-10

## 2022-06-10 RX ORDER — MIDAZOLAM HYDROCHLORIDE 1 MG/ML
2 INJECTION INTRAMUSCULAR; INTRAVENOUS ONCE
Status: DISCONTINUED | OUTPATIENT
Start: 2022-06-10 | End: 2022-06-10 | Stop reason: HOSPADM

## 2022-06-10 RX ORDER — LABETALOL HYDROCHLORIDE 5 MG/ML
5 INJECTION, SOLUTION INTRAVENOUS EVERY 10 MIN PRN
Status: DISCONTINUED | OUTPATIENT
Start: 2022-06-10 | End: 2022-06-10 | Stop reason: HOSPADM

## 2022-06-10 RX ORDER — SODIUM CHLORIDE 0.9 % (FLUSH) 0.9 %
5-40 SYRINGE (ML) INJECTION EVERY 12 HOURS SCHEDULED
Status: DISCONTINUED | OUTPATIENT
Start: 2022-06-10 | End: 2022-06-10 | Stop reason: HOSPADM

## 2022-06-10 RX ORDER — ALBUTEROL SULFATE 90 UG/1
AEROSOL, METERED RESPIRATORY (INHALATION) PRN
Status: DISCONTINUED | OUTPATIENT
Start: 2022-06-10 | End: 2022-06-10 | Stop reason: SDUPTHER

## 2022-06-10 RX ORDER — BUPIVACAINE HYDROCHLORIDE 2.5 MG/ML
INJECTION, SOLUTION INFILTRATION; PERINEURAL PRN
Status: DISCONTINUED | OUTPATIENT
Start: 2022-06-10 | End: 2022-06-10 | Stop reason: ALTCHOICE

## 2022-06-10 RX ORDER — ONDANSETRON 2 MG/ML
4 INJECTION INTRAMUSCULAR; INTRAVENOUS
Status: DISCONTINUED | OUTPATIENT
Start: 2022-06-10 | End: 2022-06-10 | Stop reason: HOSPADM

## 2022-06-10 RX ORDER — OXYCODONE HYDROCHLORIDE 5 MG/1
10 TABLET ORAL PRN
Status: COMPLETED | OUTPATIENT
Start: 2022-06-10 | End: 2022-06-10

## 2022-06-10 RX ORDER — PROPOFOL 10 MG/ML
INJECTION, EMULSION INTRAVENOUS PRN
Status: DISCONTINUED | OUTPATIENT
Start: 2022-06-10 | End: 2022-06-10 | Stop reason: SDUPTHER

## 2022-06-10 RX ORDER — DIPHENHYDRAMINE HYDROCHLORIDE 50 MG/ML
12.5 INJECTION INTRAMUSCULAR; INTRAVENOUS
Status: DISCONTINUED | OUTPATIENT
Start: 2022-06-10 | End: 2022-06-10 | Stop reason: HOSPADM

## 2022-06-10 RX ORDER — ROCURONIUM BROMIDE 10 MG/ML
INJECTION, SOLUTION INTRAVENOUS PRN
Status: DISCONTINUED | OUTPATIENT
Start: 2022-06-10 | End: 2022-06-10 | Stop reason: SDUPTHER

## 2022-06-10 RX ORDER — SODIUM CHLORIDE, SODIUM LACTATE, POTASSIUM CHLORIDE, CALCIUM CHLORIDE 600; 310; 30; 20 MG/100ML; MG/100ML; MG/100ML; MG/100ML
INJECTION, SOLUTION INTRAVENOUS CONTINUOUS
Status: DISCONTINUED | OUTPATIENT
Start: 2022-06-10 | End: 2022-06-10 | Stop reason: HOSPADM

## 2022-06-10 RX ORDER — SODIUM CHLORIDE 0.9 % (FLUSH) 0.9 %
5-40 SYRINGE (ML) INJECTION PRN
Status: DISCONTINUED | OUTPATIENT
Start: 2022-06-10 | End: 2022-06-10 | Stop reason: HOSPADM

## 2022-06-10 RX ORDER — ONDANSETRON 2 MG/ML
INJECTION INTRAMUSCULAR; INTRAVENOUS PRN
Status: DISCONTINUED | OUTPATIENT
Start: 2022-06-10 | End: 2022-06-10 | Stop reason: SDUPTHER

## 2022-06-10 RX ORDER — SODIUM CHLORIDE 9 MG/ML
INJECTION, SOLUTION INTRAVENOUS PRN
Status: DISCONTINUED | OUTPATIENT
Start: 2022-06-10 | End: 2022-06-10 | Stop reason: HOSPADM

## 2022-06-10 RX ORDER — MIDAZOLAM HYDROCHLORIDE 1 MG/ML
INJECTION INTRAMUSCULAR; INTRAVENOUS PRN
Status: DISCONTINUED | OUTPATIENT
Start: 2022-06-10 | End: 2022-06-10 | Stop reason: SDUPTHER

## 2022-06-10 RX ORDER — MEPERIDINE HYDROCHLORIDE 25 MG/ML
12.5 INJECTION INTRAMUSCULAR; INTRAVENOUS; SUBCUTANEOUS EVERY 5 MIN PRN
Status: DISCONTINUED | OUTPATIENT
Start: 2022-06-10 | End: 2022-06-10 | Stop reason: HOSPADM

## 2022-06-10 RX ORDER — OXYCODONE HYDROCHLORIDE AND ACETAMINOPHEN 5; 325 MG/1; MG/1
1 TABLET ORAL EVERY 6 HOURS PRN
Qty: 28 TABLET | Refills: 0 | Status: SHIPPED | OUTPATIENT
Start: 2022-06-10 | End: 2022-06-16 | Stop reason: SDUPTHER

## 2022-06-10 RX ORDER — FENTANYL CITRATE 50 UG/ML
INJECTION, SOLUTION INTRAMUSCULAR; INTRAVENOUS PRN
Status: DISCONTINUED | OUTPATIENT
Start: 2022-06-10 | End: 2022-06-10 | Stop reason: SDUPTHER

## 2022-06-10 RX ORDER — LIDOCAINE HYDROCHLORIDE 10 MG/ML
INJECTION, SOLUTION EPIDURAL; INFILTRATION; INTRACAUDAL; PERINEURAL
Status: DISCONTINUED
Start: 2022-06-10 | End: 2022-06-10 | Stop reason: HOSPADM

## 2022-06-10 RX ORDER — MIDAZOLAM HYDROCHLORIDE 1 MG/ML
INJECTION INTRAMUSCULAR; INTRAVENOUS
Status: DISCONTINUED
Start: 2022-06-10 | End: 2022-06-10 | Stop reason: HOSPADM

## 2022-06-10 RX ORDER — OXYCODONE HYDROCHLORIDE 5 MG/1
5 TABLET ORAL PRN
Status: COMPLETED | OUTPATIENT
Start: 2022-06-10 | End: 2022-06-10

## 2022-06-10 RX ORDER — LIDOCAINE HYDROCHLORIDE 20 MG/ML
INJECTION, SOLUTION EPIDURAL; INFILTRATION; INTRACAUDAL; PERINEURAL PRN
Status: DISCONTINUED | OUTPATIENT
Start: 2022-06-10 | End: 2022-06-10 | Stop reason: SDUPTHER

## 2022-06-10 RX ADMIN — MIDAZOLAM 2 MG: 1 INJECTION INTRAMUSCULAR; INTRAVENOUS at 07:57

## 2022-06-10 RX ADMIN — ONDANSETRON 4 MG: 2 INJECTION INTRAMUSCULAR; INTRAVENOUS at 08:14

## 2022-06-10 RX ADMIN — LIDOCAINE HYDROCHLORIDE 50 MG: 20 INJECTION, SOLUTION EPIDURAL; INFILTRATION; INTRACAUDAL; PERINEURAL at 08:01

## 2022-06-10 RX ADMIN — HYDROMORPHONE HYDROCHLORIDE 0.5 MG: 1 INJECTION, SOLUTION INTRAMUSCULAR; INTRAVENOUS; SUBCUTANEOUS at 09:11

## 2022-06-10 RX ADMIN — Medication 2 PUFF: at 08:10

## 2022-06-10 RX ADMIN — Medication 2000 MG: at 07:57

## 2022-06-10 RX ADMIN — PROPOFOL 150 MG: 10 INJECTION, EMULSION INTRAVENOUS at 08:01

## 2022-06-10 RX ADMIN — OXYCODONE 5 MG: 5 TABLET ORAL at 09:35

## 2022-06-10 RX ADMIN — Medication 2 PUFF: at 08:08

## 2022-06-10 RX ADMIN — SODIUM CHLORIDE, POTASSIUM CHLORIDE, SODIUM LACTATE AND CALCIUM CHLORIDE: 600; 310; 30; 20 INJECTION, SOLUTION INTRAVENOUS at 07:13

## 2022-06-10 RX ADMIN — SUGAMMADEX 200 MG: 100 INJECTION, SOLUTION INTRAVENOUS at 08:28

## 2022-06-10 RX ADMIN — FENTANYL CITRATE 100 MCG: 50 INJECTION INTRAMUSCULAR; INTRAVENOUS at 08:14

## 2022-06-10 RX ADMIN — ROCURONIUM BROMIDE 50 MG: 10 INJECTION, SOLUTION INTRAVENOUS at 08:05

## 2022-06-10 RX ADMIN — LIDOCAINE HYDROCHLORIDE 0.3 ML: 10 INJECTION, SOLUTION EPIDURAL; INFILTRATION; INTRACAUDAL; PERINEURAL at 07:17

## 2022-06-10 ASSESSMENT — PAIN DESCRIPTION - ORIENTATION
ORIENTATION: LEFT
ORIENTATION: LEFT

## 2022-06-10 ASSESSMENT — PAIN DESCRIPTION - LOCATION
LOCATION: KNEE
LOCATION: KNEE

## 2022-06-10 ASSESSMENT — PAIN DESCRIPTION - DESCRIPTORS: DESCRIPTORS: ACHING;BURNING

## 2022-06-10 ASSESSMENT — PAIN SCALES - GENERAL
PAINLEVEL_OUTOF10: 7
PAINLEVEL_OUTOF10: 4

## 2022-06-10 NOTE — H&P
Update History & Physical    The patient's History and Physical  was reviewed with the patient and I examined the patient. There was no change. The surgical site was confirmed by the patient and me. Plan: The risks, benefits, expected outcome, and alternative to the recommended procedure have been discussed with the patient. Patient understands and wants to proceed with the procedure.      Electronically signed by Sander Wick MD on 6/10/2022 at 7:50 AM

## 2022-06-10 NOTE — ANESTHESIA POSTPROCEDURE EVALUATION
Department of Anesthesiology  Postprocedure Note    Patient: Tracie Garcia  MRN: [id number]  YOB: 1975  Date of evaluation: 6/10/2022  Time:  9:53 AM     Procedure Summary     Date: 06/10/22 Room / Location: [facility] 01 / [facility]    Anesthesia Start: 0141 Anesthesia Stop: 1149    Procedure: LEFT KNEE VIDEOARTHROSCOPY WITH CHONDROPLASTY OF PATELLA (Left Knee) Diagnosis:       Chondromalacia of left patella      (LEFT KNEE CHONDROMALACIA PATELLA)    Surgeons: Gabrielle Cook MD Responsible Provider: Keely Warner MD    Anesthesia Type: general ASA Status: 3          Anesthesia Type: No value filed. Jorge A Phase I: Jorge A Score: 10    Jorge A Phase II: Jorge A Score: 10    Last vitals: Reviewed and per EMR flowsheets.        Anesthesia Post Evaluation    Patient location during evaluation: PACU  Patient participation: complete - patient participated  Level of consciousness: awake and alert  Pain score: 0  Airway patency: patent  Nausea & Vomiting: no nausea and no vomiting  Complications: no  Cardiovascular status: blood pressure returned to baseline  Respiratory status: acceptable  Hydration status: stable

## 2022-06-10 NOTE — BRIEF OP NOTE
Brief Postoperative Note      Patient: Christin Lagunas  YOB: 1975  MRN: [id number]    Date of Procedure: 6/10/2022    Pre-Op Diagnosis: LEFT KNEE CHONDROMALACIA PATELLA    Post-Op Diagnosis: Same       Procedure(s):  LEFT KNEE VIDEOARTHROSCOPY WITH CHONDROPLASTY OF PATELLA    Surgeon(s):  Rachel Carlisle MD    Assistant:  * No surgical staff found *    Anesthesia: General    Estimated Blood Loss (mL): Minimal    Complications: None    Specimens:   * No specimens in log *    Implants:  * No implants in log *      Drains: * No LDAs found *    Findings: cholo    Electronically signed by Rachel Carlisle MD on 6/10/2022 at 8:30 AM

## 2022-06-10 NOTE — ANESTHESIA PRE PROCEDURE
Department of Anesthesiology  Preprocedure Note       Name:  Lanetta Cabot   Age:  56 y.o.  :  1975                                          MRN:  [id number]         Date:  6/10/2022      Surgeon: Barb Garcia):  Mere Andersen MD    Procedure: Procedure(s):  LEFT KNEE VIDEOARTHROSCOPY WITH CHONDROPLASTY OF PATELLA    Medications prior to admission:   Prior to Admission medications    Medication Sig Start Date End Date Taking? Authorizing Provider   Cyanocobalamin (VITAMIN B 12 PO) Take by mouth   Yes Historical Provider, MD   meloxicam (MOBIC) 15 MG tablet Take 1 tablet by mouth daily 3/24/22   Mere Andersen MD   lisinopril (PRINIVIL;ZESTRIL) 10 MG tablet Take 10 mg by mouth daily    Historical Provider, MD   buPROPion (WELLBUTRIN SR) 100 MG extended release tablet Take 100 mg by mouth 2 times daily    Historical Provider, MD   amitriptyline (ELAVIL) 10 MG tablet Take 10 mg by mouth nightly    Historical Provider, MD       Current medications:    Current Facility-Administered Medications   Medication Dose Route Frequency Provider Last Rate Last Admin    ceFAZolin (ANCEF) 2000 mg in sterile water 20 mL IV syringe  2,000 mg IntraVENous Once Mere Andersen MD        lidocaine PF 1 % injection 0.3 mL  0.3 mL IntraDERmal Once PRN Marin Núñez MD        lactated ringers infusion   IntraVENous Continuous Marin Núñez  mL/hr at 06/10/22 0713 New Bag at 06/10/22 0713    sodium chloride flush 0.9 % injection 5-40 mL  5-40 mL IntraVENous 2 times per day Marin Núñez MD        sodium chloride flush 0.9 % injection 5-40 mL  5-40 mL IntraVENous PRN Marin Núñez MD        0.9 % sodium chloride infusion   IntraVENous PRN Marin Núñez MD        lidocaine PF 1 % injection             midazolam (VERSED) 2 MG/2ML injection                Allergies:     Allergies   Allergen Reactions    Imitrex [Sumatriptan] Shortness Of Breath and Palpitations       Problem List:    Patient Active Problem List Diagnosis Code    Acute pain of right knee M25.561    Chondromalacia of left knee M94.262    Cruciate ligament, anterior, disruption, old, left M23.52    Chondromalacia patellae of left knee M22.42    Left knee pain M25.562       Past Medical History:        Diagnosis Date    Hypertension     Migraines        Past Surgical History:        Procedure Laterality Date    HYSTERECTOMY (CERVIX STATUS UNKNOWN)      KNEE ARTHROSCOPY Left 11/22/2019    LEFT KNEE VIDEO ARTHROSCOPY, CHONDROPLASTY OF PATELLA, ANTERIOR CRUCIATE LIGAMENT DEBRIDEMENT performed by Kartik Garay MD at [address] Left 11/22/2019    knee arthroscopy, chodrplasty of patella, debridement of ACL    TUBAL LIGATION         Social History:    Social History     Tobacco Use    Smoking status: Never Smoker    Smokeless tobacco: Never Used   Substance Use Topics    Alcohol use: Not on file                                Counseling given: Not Answered      Vital Signs (Current):   Vitals:    06/03/22 1107 06/10/22 0617   BP:  (!) 173/90   Pulse:  95   Resp:  18   Temp:  97.3 °F (36.3 °C)   TempSrc:  Temporal   SpO2:  98%   Weight: 220 lb (99.8 kg) 225 lb (102.1 kg)   Height: 5' 2\" (1.575 m) 5' 2\" (1.575 m)                                              BP Readings from Last 3 Encounters:   06/10/22 (!) 173/90   11/22/19 124/63   11/22/19 (!) 170/85       NPO Status: Time of last liquid consumption: 2200                        Time of last solid consumption: 2200                        Date of last liquid consumption: 06/09/22                        Date of last solid food consumption: 06/09/22    BMI:   Wt Readings from Last 3 Encounters:   06/10/22 225 lb (102.1 kg)   03/24/22 220 lb (99.8 kg)   11/27/19 220 lb 0.3 oz (99.8 kg)     Body mass index is 41.15 kg/m².     CBC: No results found for: WBC, RBC, HGB, HCT, MCV, RDW, PLT    CMP: No results found for: NA, K, CL, CO2, BUN, CREATININE, GFRAA, AGRATIO, LABGLOM, GLUCOSE, GLU, PROT, CALCIUM, BILITOT, ALKPHOS, AST, ALT    POC Tests: No results for input(s): POCGLU, POCNA, POCK, POCCL, POCBUN, POCHEMO, POCHCT in the last 72 hours. Coags: No results found for: PROTIME, INR, APTT    HCG (If Applicable): No results found for: PREGTESTUR, PREGSERUM, HCG, HCGQUANT     ABGs: No results found for: PHART, PO2ART, TAP6UGX, WHL7PCR, BEART, M1TCENQZ     Type & Screen (If Applicable):  No results found for: LABABO, LABRH    Drug/Infectious Status (If Applicable):  No results found for: HIV, HEPCAB    COVID-19 Screening (If Applicable): No results found for: COVID19        Anesthesia Evaluation  Patient summary reviewed and Nursing notes reviewed  Airway: Mallampati: III  TM distance: >3 FB   Neck ROM: full  Mouth opening: < 3 FB   Dental: normal exam         Pulmonary:Negative Pulmonary ROS and normal exam  breath sounds clear to auscultation                             Cardiovascular:    (+) hypertension:,         Rhythm: regular  Rate: normal                    Neuro/Psych:   (+) headaches:,             GI/Hepatic/Renal:   (+) morbid obesity          Endo/Other: Negative Endo/Other ROS                    Abdominal:   (+) obese,           Vascular: negative vascular ROS. Other Findings:           Anesthesia Plan      general     ASA 3       Induction: intravenous. MIPS: Postoperative opioids intended and Prophylactic antiemetics administered. Anesthetic plan and risks discussed with patient. Plan discussed with CRNA.                     Carlitos López MD   6/10/2022

## 2022-06-10 NOTE — OP NOTE
[address]                 [address] [address]                                OPERATIVE REPORT    PATIENT NAME: Jose HALE                     :        1975  MED REC NO:   [id number]                          ROOM:  ACCOUNT NO:   [de-identified]                           ADMIT DATE: 06/10/2022  PROVIDER:     Wendy Deng MD    DATE OF PROCEDURE:  06/10/2022    PREOPERATIVE DIAGNOSES:  Left knee chondromalacia patella, ACL tear. POSTOPERATIVE DIAGNOSES:  Left knee chondromalacia patella, ACL tear. OPERATION PERFORMED:  Left knee video arthroscopy with chondroplasty of  the patella, trochlear groove, and ACL debridement. SURGEON:  Wendy Deng MD    ANESTHESIA:  General.    BLOOD LOSS:  Less than 5 mL. COMPLICATIONS:  None noted. INDICATIONS FOR OPERATION:  This 72-year-old female presented with left  knee pain and MRI confirmed she had  chondromalacia patella as well as  ACL tear. After failure of all other modalities, she elected for  further recommendations of surgical intervention. DESCRIPTION OF OPERATION:  The patient was taken to the operating room  where a general anesthetic was obtained. Antibiotics were given IV  piggyback preoperatively. Her left knee and leg were sterilely prepped  and draped. A two-port arthroscopy of the left knee was carried out in  usual fashion using a 30-degree arthroscope. Upon entry into the knee,  she was noted to have some grade 2 chondromalacia patella, which was  provisionally smoothed with an ArthroCare wand. She had an ACL tear  with a cyclops lesion. This was impinging the patellofemoral joint and  probably causes some of the chondromalacia and this was also debrided  arthroscopically. Otherwise, the medial and lateral menisci were stable  and left alone. There were some fibers of the ACL, which were intact.    The knee was then thoroughly irrigated and evacuated off all loose  debris and I instilled 20 mL of 0.25% Marcaine plain. The port was  repaired with 4-0 nylon in a figure-of-eight fashion. Knee was dressed  with Steri-Strips, dressings, sponge, ABDs, Webril, and Ace wrap. She  appeared to tolerate the procedure well and was taken to the recovery  room stable. Less than 5 mL of blood loss and no noted complications.       Jesus Smallwood MD    D: 06/10/2022 8:44:59       T: 06/10/2022 14:29:09     [email or web address]  Job#: [id number]     Doc#: [id number]    CC:

## 2022-06-10 NOTE — PROGRESS NOTES
Pt arrived to PACU, asleep from anesthesia, arouses easy, vitals stable, see doc flowsheets, will remain at bedside to monitor

## 2022-06-16 ENCOUNTER — OFFICE VISIT (OUTPATIENT)
Dept: ORTHOPEDIC SURGERY | Age: 47
End: 2022-06-16

## 2022-06-16 VITALS — BODY MASS INDEX: 41.41 KG/M2 | HEIGHT: 62 IN | WEIGHT: 225 LBS

## 2022-06-16 DIAGNOSIS — M22.42 CHONDROMALACIA PATELLAE OF LEFT KNEE: Primary | ICD-10-CM

## 2022-06-16 DIAGNOSIS — M25.562 ACUTE PAIN OF LEFT KNEE: ICD-10-CM

## 2022-06-16 DIAGNOSIS — M17.12 PRIMARY OSTEOARTHRITIS OF LEFT KNEE: ICD-10-CM

## 2022-06-16 PROCEDURE — 99024 POSTOP FOLLOW-UP VISIT: CPT | Performed by: ORTHOPAEDIC SURGERY

## 2022-06-16 RX ORDER — OXYCODONE HYDROCHLORIDE AND ACETAMINOPHEN 5; 325 MG/1; MG/1
1 TABLET ORAL EVERY 6 HOURS PRN
Qty: 28 TABLET | Refills: 0 | Status: SHIPPED | OUTPATIENT
Start: 2022-06-16 | End: 2022-06-23

## 2022-06-16 NOTE — PROGRESS NOTES
FOLLOW-UP VISIT      The patient returns for follow-up s/p left knee medial arthroscopy with chondroplasty of the patella and limited synovectomy    Date of Surgery    6/10/2022    Wound Status    Sutures Removed, Incisions are healing well with no surrounding erythema, and minimal ecchymosis. Exam    Shows no signs of infection DVT. Still is having some pain would recommend refill of her pain medicine and give her home therapy program to pursue her next 3 weeks.   I would recommend she remain off work at least 3 weeks and return here prior to that for consideration of viscosupplementation    Plan    Return in 3 weeks    Follow-up Appointment    4 weeks for consideration of viscosupplementation

## 2022-06-16 NOTE — LETTER
[address] [place] [address]  Phone: [phone]  Fax: [phone]    Sander Wick MD        June 16, 2022     Patient: Everton Carrera   YOB: 1975   Date of Visit: 6/16/2022       To Whom It May Concern: It is my medical opinion that Rebeca Chin should remain out of work until 07/07/2022. If you have any questions or concerns, please don't hesitate to call.     Sincerely,        Sander Wick MD

## 2022-06-21 ENCOUNTER — TELEPHONE (OUTPATIENT)
Dept: ORTHOPEDIC SURGERY | Age: 47
End: 2022-06-21

## 2022-06-21 NOTE — TELEPHONE ENCOUNTER
Faxed updated aps for [address] [facility] [address] @ [phone]

## 2022-06-30 ENCOUNTER — OFFICE VISIT (OUTPATIENT)
Dept: ORTHOPEDIC SURGERY | Age: 47
End: 2022-06-30

## 2022-06-30 VITALS — WEIGHT: 225 LBS | HEIGHT: 62 IN | BODY MASS INDEX: 41.41 KG/M2

## 2022-06-30 DIAGNOSIS — M22.42 CHONDROMALACIA PATELLAE OF LEFT KNEE: ICD-10-CM

## 2022-06-30 DIAGNOSIS — M17.12 PRIMARY OSTEOARTHRITIS OF LEFT KNEE: Primary | ICD-10-CM

## 2022-06-30 PROCEDURE — 99024 POSTOP FOLLOW-UP VISIT: CPT | Performed by: ORTHOPAEDIC SURGERY

## 2022-06-30 RX ORDER — PREDNISONE 1 MG/1
TABLET ORAL
Qty: 55 TABLET | Refills: 0 | Status: SHIPPED | OUTPATIENT
Start: 2022-06-30

## 2022-06-30 NOTE — Clinical Note
[address] [address]  Phone: [phone]  Fax: [phone]    Da Martin MD        June 30, 2022     Patient: Jorge L Navarro   YOB: 1975   Date of Visit: 6/30/2022       To Whom It May Concern: It is my medical opinion that Peter Arreola {Work release (duty restriction):28306}. If you have any questions or concerns, please don't hesitate to call.     Sincerely,        Da Martin MD

## 2022-06-30 NOTE — LETTER
[address] [address]  Phone: [phone]  Fax: [phone]    Yolie Sky MD        June 30, 2022     Patient: Elizabeth Neri   YOB: 1975   Date of Visit: 6/30/2022       To Whom It May Concern: It is my medical opinion that Mannie Ludwig may return to work on 7/10/2022 with the following restrictions: No repetitive bending & squatting for 6 weeks or until seen back in our office  . If you have any questions or concerns, please don't hesitate to call. Sincerely,          Zelda Hawk.  MD Yolie Lawson MD

## 2022-06-30 NOTE — LETTER
[address] [address]  Phone: [phone]  Fax: [phone]    Mere Andersen MD        June 30, 2022     Patient: Lanetta Cabot   YOB: 1975   Date of Visit: 6/30/2022       To Whom It May Concern: It is my medical opinion that Leti Li may return to work on 07/10/2022 with the following restrictions: lifting/carrying not to exceed 10 lbs. No repetitive bending & squatting     If you have any questions or concerns, please don't hesitate to call. Sincerely,          Nilesh Alvares.  MD Mere Clancy MD

## 2022-06-30 NOTE — PROGRESS NOTES
FOLLOW-UP VISIT      The patient returns for follow-up s/p left knee medial arthroscopy with chondroplasty of the patella and limited synovectomy    Date of Surgery    6/10/2022    Exam    She comes in today much improved I feel she will be ready to return to work in a few weeks but probably with some restrictions for lifting and prolonged walking and squatting etc.  I also feel that viscosupplementation will be appropriate for her so we will request that also.   I will give her a prednisone taper to take upon returning to work if her pain returns    Plan    Return for viscosupplementation    Follow-up Appointment    As above

## 2022-07-15 ENCOUNTER — TELEPHONE (OUTPATIENT)
Dept: ORTHOPEDIC SURGERY | Age: 47
End: 2022-07-15

## 2022-07-20 ENCOUNTER — OFFICE VISIT (OUTPATIENT)
Dept: ORTHOPEDIC SURGERY | Age: 47
End: 2022-07-20
Payer: COMMERCIAL

## 2022-07-20 VITALS — WEIGHT: 225 LBS | BODY MASS INDEX: 41.41 KG/M2 | HEIGHT: 62 IN

## 2022-07-20 DIAGNOSIS — M22.42 CHONDROMALACIA PATELLAE OF LEFT KNEE: ICD-10-CM

## 2022-07-20 DIAGNOSIS — M17.12 PRIMARY OSTEOARTHRITIS OF LEFT KNEE: Primary | ICD-10-CM

## 2022-07-20 PROCEDURE — 20610 DRAIN/INJ JOINT/BURSA W/O US: CPT | Performed by: ORTHOPAEDIC SURGERY

## 2022-07-20 PROCEDURE — 99024 POSTOP FOLLOW-UP VISIT: CPT | Performed by: ORTHOPAEDIC SURGERY

## 2022-07-20 RX ORDER — TRIAMCINOLONE ACETONIDE 40 MG/ML
40 INJECTION, SUSPENSION INTRA-ARTICULAR; INTRAMUSCULAR ONCE
Status: COMPLETED | OUTPATIENT
Start: 2022-07-20 | End: 2022-07-20

## 2022-07-20 RX ORDER — BUPIVACAINE HYDROCHLORIDE 2.5 MG/ML
7 INJECTION, SOLUTION INFILTRATION; PERINEURAL ONCE
Status: COMPLETED | OUTPATIENT
Start: 2022-07-20 | End: 2022-07-20

## 2022-07-20 RX ORDER — HYALURONATE SODIUM 10 MG/ML
20 SYRINGE (ML) INTRAARTICULAR ONCE
Status: COMPLETED | OUTPATIENT
Start: 2022-07-20 | End: 2022-07-20

## 2022-07-20 RX ADMIN — BUPIVACAINE HYDROCHLORIDE 17.5 MG: 2.5 INJECTION, SOLUTION INFILTRATION; PERINEURAL at 14:17

## 2022-07-20 RX ADMIN — TRIAMCINOLONE ACETONIDE 40 MG: 40 INJECTION, SUSPENSION INTRA-ARTICULAR; INTRAMUSCULAR at 14:17

## 2022-07-20 RX ADMIN — Medication 20 MG: at 14:18

## 2022-07-20 NOTE — PROGRESS NOTES
FOLLOW-UP VISIT      The patient returns for follow-up s/p left knee medial arthroscopy with chondroplasty of the patella and limited synovectomy    Date of Surgery    6/10/2022    Exam    She comes in today much improved but she did not return to work half days post limb pain in her boss says that she can have modification of her restrictions which may benefit her over the next several weeks. Additionally her insurance company denied viscosupplementation. I will give her 1 at the sample. I will combine that with cortisone to enhance its effect. Plan    Modified work restriction, Euflexxa and cortisone today    Follow-up Appointment    4 to 6 weeks as needed     HISTORY OF PRESENT ILLNESS: Patient returns today for Euflexxa injection done to the left knee that was performed under a sterile Betadine prep, using ethyl chloride as a topical refrigerant, for a diagnosis of osteoarthritis. The injection of 2 ml of Euflexxa was done from an anterolateral joint line approach using a 25-gauge needle. It was done without incident and the patient tolerated it well. Recommendation is for a cortisone injection into the left knee. After informed consent was received from the patient, the left knee was injected with1 mL of 40mg/ml of Kenalog and 4 mL  of 0.25% Marcaine in the syringe from an anterolateral joint line approach, using a 25-gauge needle, under sterile Betadine prep, using ethyl chloride as a topical refrigerant, for a diagnosis of osteoarthritis. The patient appeared to tolerate it well. The patient should return here periodically as needed.

## 2022-08-31 ENCOUNTER — TELEPHONE (OUTPATIENT)
Dept: ORTHOPEDIC SURGERY | Age: 47
End: 2022-08-31

## 2022-08-31 NOTE — TELEPHONE ENCOUNTER
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 DOES PATIENT NEED TO BE SEEN IN OFFICE BEFORE THIS CAN BE ADJUSTED?  PLS CALL TO [facility] [phone]

## 2022-09-01 NOTE — TELEPHONE ENCOUNTER
ATTEMPTED TO CONTACT PATIENT. VM WAS FULL AND UNABLE TO LM. PATIENT WILL NEED TO MAKE APPOINTMENT FOR WORK RESTRICTIONS TO BE EXTENDED.

## 2022-09-14 ENCOUNTER — OFFICE VISIT (OUTPATIENT)
Dept: ORTHOPEDIC SURGERY | Age: 47
End: 2022-09-14
Payer: COMMERCIAL

## 2022-09-14 VITALS — BODY MASS INDEX: 41.41 KG/M2 | HEIGHT: 62 IN | WEIGHT: 225 LBS

## 2022-09-14 DIAGNOSIS — M25.562 CHRONIC PAIN OF LEFT KNEE: ICD-10-CM

## 2022-09-14 DIAGNOSIS — M17.12 PRIMARY OSTEOARTHRITIS OF LEFT KNEE: Primary | ICD-10-CM

## 2022-09-14 DIAGNOSIS — G89.29 CHRONIC PAIN OF LEFT KNEE: ICD-10-CM

## 2022-09-14 PROCEDURE — 99212 OFFICE O/P EST SF 10 MIN: CPT | Performed by: ORTHOPAEDIC SURGERY

## 2022-09-14 NOTE — PROGRESS NOTES
She returns today for evaluation. She is tolerating her job restrictions fairly well and has been trying to wean herself off of her cane and ambulatory aids at home but still feels a little wobbly and has pain which comes and goes depending upon the time of day. She says her employer is willing to work with restrictions and because that would recommend she be given the same work restrictions for an additional 3 months at which point she can return for reevaluation.   She is comfortable with those recommendations will proceed accordingly

## 2022-10-26 ENCOUNTER — OFFICE VISIT (OUTPATIENT)
Dept: ORTHOPEDIC SURGERY | Age: 47
End: 2022-10-26
Payer: COMMERCIAL

## 2022-10-26 VITALS — BODY MASS INDEX: 41.41 KG/M2 | WEIGHT: 225 LBS | HEIGHT: 62 IN

## 2022-10-26 DIAGNOSIS — M22.42 CHONDROMALACIA PATELLAE OF LEFT KNEE: ICD-10-CM

## 2022-10-26 DIAGNOSIS — G89.29 CHRONIC PAIN OF RIGHT KNEE: Primary | ICD-10-CM

## 2022-10-26 DIAGNOSIS — M25.562 LEFT KNEE PAIN, UNSPECIFIED CHRONICITY: ICD-10-CM

## 2022-10-26 DIAGNOSIS — M25.561 CHRONIC PAIN OF RIGHT KNEE: Primary | ICD-10-CM

## 2022-10-26 PROCEDURE — 99213 OFFICE O/P EST LOW 20 MIN: CPT | Performed by: ORTHOPAEDIC SURGERY

## 2022-10-26 NOTE — PROGRESS NOTES
She returns today for evaluation. Apparently heard employers take her off work and has done well with the light duty position as she was in. At this time, she still has substantial pain and uses a cane to get around. She has been 4 months since her surgery plus, I would recommend proceeding with an MRI of the left knee to ensure she does not have any additional pathology within it. Also because she has had substantial time off to recover with what seemed like it was just chondromalacia patella, I do recommend she be referred to Dr. Christine Krishna for consultation to see if he would recommend any other intervention.   In the interim I will keep her off 3 months pending further evaluation and consultation as recommended above

## 2022-10-27 ENCOUNTER — TELEPHONE (OUTPATIENT)
Dept: ORTHOPEDIC SURGERY | Age: 47
End: 2022-10-27

## 2022-10-27 NOTE — TELEPHONE ENCOUNTER
SMITH FOR PATIENT IN REGARDS TO MRI APPROVAL RIGHT KNEE. INFORMED PATIENT MRI ORDER ALONG WITH MRI AUTHORIZATION WAS FAXED TO Krysta Jones. INFORMED PATIENT TO CALL AT THEIR CONVENIENCE TO SCHEDULE THAT MRI.  ALSO, INFORMED PATIENT TO CALL OUR SCHEDULING DEPT TO SCHEDULE FOLLOW UP APPOINTMENT  WITH DR RUIZ TO GO OVER THOSE RESULTS

## 2022-11-02 ENCOUNTER — TELEPHONE (OUTPATIENT)
Dept: ORTHOPEDIC SURGERY | Age: 47
End: 2022-11-02

## 2022-11-02 DIAGNOSIS — M25.562 LEFT KNEE PAIN, UNSPECIFIED CHRONICITY: Primary | ICD-10-CM

## 2022-11-02 NOTE — TELEPHONE ENCOUNTER
ORDER FOR MRI SAYS RIGHT KNEE W/O CONTRAST PATIENT SAYS IT IS HER LEFT KNEE THEY NEED A REVISED ORDER FOR LEFT KNEE FAXED [phone]

## 2022-11-04 ENCOUNTER — TELEPHONE (OUTPATIENT)
Dept: ORTHOPEDIC SURGERY | Age: 47
End: 2022-11-04

## 2022-11-09 ENCOUNTER — TELEPHONE (OUTPATIENT)
Dept: ORTHOPEDIC SURGERY | Age: 47
End: 2022-11-09

## 2022-11-09 NOTE — TELEPHONE ENCOUNTER
SMITH FOR PATIENT IN REGARDS TO MRI APPROVAL LEFT KNEE. INFORMED PATIENT MRI ORDER ALONG WITH MRI AUTHORIZATION WAS FAXED TO Sherice Nick. INFORMED PATIENT TO CALL AT THEIR CONVENIENCE TO SCHEDULE THAT MRI.  ALSO, INFORMED PATIENT TO CALL OUR SCHEDULING DEPT TO SCHEDULE FOLLOW UP APPOINTMENT  WITH DR RUIZ TO GO OVER THOSE RESULTS

## 2022-11-16 ENCOUNTER — TELEPHONE (OUTPATIENT)
Dept: ORTHOPEDIC SURGERY | Age: 47
End: 2022-11-16

## 2022-11-16 DIAGNOSIS — M17.12 PRIMARY OSTEOARTHRITIS OF LEFT KNEE: ICD-10-CM

## 2022-11-16 DIAGNOSIS — M22.42 CHONDROMALACIA PATELLAE OF LEFT KNEE: Primary | ICD-10-CM

## 2022-12-13 ENCOUNTER — OFFICE VISIT (OUTPATIENT)
Dept: ORTHOPEDIC SURGERY | Age: 47
End: 2022-12-13

## 2022-12-13 ENCOUNTER — TELEPHONE (OUTPATIENT)
Dept: ORTHOPEDIC SURGERY | Age: 47
End: 2022-12-13

## 2022-12-13 VITALS — BODY MASS INDEX: 41.41 KG/M2 | WEIGHT: 225 LBS | HEIGHT: 62 IN

## 2022-12-13 DIAGNOSIS — M17.12 PRIMARY OSTEOARTHRITIS OF LEFT KNEE: Primary | ICD-10-CM

## 2022-12-13 DIAGNOSIS — M25.562 ACUTE PAIN OF LEFT KNEE: ICD-10-CM

## 2022-12-13 RX ORDER — GABAPENTIN 300 MG/1
300 CAPSULE ORAL NIGHTLY
Qty: 60 CAPSULE | Refills: 0 | Status: SHIPPED | OUTPATIENT
Start: 2022-12-13 | End: 2023-02-11

## 2022-12-13 NOTE — PROGRESS NOTES
Chief Complaint  Knee Pain (LEFT KNEE)      History of Present Illness:  Lisa Kenney is a pleasant 56 y.o. female here today for established patient evaluation regarding her left knee. She was referred to me by Dr. Piotr Felder. The patient has had a long history with her left knee. She underwent a left knee arthroscopy with chondroplasty of the patella and trochlear groove as well as ACL debridement by Dr. Piotr Felder on 6/13/2022. The patient did not get much relief from the procedure. She underwent gel and steroid injections without much improvement. A repeat MRI was performed which demonstrated a complete ACL tear and severe chondromalacia to the patellofemoral joint. The patient was referred to me for evaluation for her knee for other potential treatment options. The patient unfortunately cannot take anti-inflammatories that she was taking them for quite some time and this started to damage her kidney and liver per her PCP. Pain Assessment  Location of Pain: Knee  Location Modifiers: Left  Severity of Pain: 8  Quality of Pain: Aching  Duration of Pain: Persistent  Frequency of Pain: Constant    Medical History:  Patient's medications, allergies, past medical, surgical, social and family histories were reviewed and updated as appropriate. Pertinent items are noted in HPI  Review of systems reviewed from Patient History Form dated on 12/13/22 and available in the patient's chart under the Media tab. Vital Signs: There were no vitals filed for this visit. Constitutional: In no apparent distress. Normal affect. Alert and oriented X3 and is cooperative. Left knee exam    Limited exam secondary to inflammation and pain. No skin changes noted. No erythema. Very tender along the medial aspect of the knee to light touch. Hypersensitive to light brushing of the skin along the saphenous nerve distribution in the medial knee and the medial distal thigh.   Knee range of motion 0 to 70 degrees with pain on deeper flexion. Unable to test varus or valgus stability or Lachman secondary to pain. Patient ambulating with a cane. Radiology:       1 view of the left knee taken in the office today demonstrates moderate to severe patellofemoral osteoarthritis prior x-rays reviewed demonstrate the same. MRI results reviewed by myself demonstrate an ACL tear with mild to moderate arthritis in the medial compartment and moderate to severe patellofemoral osteoarthritis         Assessment : 50-year-old female with left knee severe patellofemoral chondromalacia, saphenous neuritis versus lumbar radiculopathy    Impression:  Encounter Diagnoses   Name Primary? Acute pain of left knee     Primary osteoarthritis of left knee Yes       Office Procedures:  Orders Placed This Encounter   Procedures    XR KNEE LEFT (1-2 VIEWS)     Standing Status:   Future     Number of Occurrences:   1     Standing Expiration Date:   12/13/2023    [facility] Physical Therapy [facility] (Ortho & Sports)-[facility]     Referral Priority:   Routine     Referral Type:   Eval and Treat     Referral Reason:   Specialty Services Required     Requested Specialty:   Physical Therapist     Number of Visits Requested:   1         Plan:     I had a very long discussion with the patient today. Based on the patient's physical exam findings she has more findings than just arthritic pain. Her saphenous nerve distribution in particular is very sensitive to even light touch and gentle palpation. She also reports the pain going up and down her leg at times. She denies much back pain. For now I will treat her for saphenous neuritis flare with gabapentin nightly. I will also get her set up with physical therapy for gait training to try to strengthen her core hips and knees to help her walk a little bit more straight and get off of her cane. I will also have them work on knee range of motion. She can follow-up with me in 5 to 6 weeks for repeat evaluation. The patient is off work from [employer] until January 26 and I think this is reasonable given her severe disability from her pain. I will get an x-ray of her lumbar spine at the next visit. Leonid Pratt is in agreement with this plan. All questions were answered to patient's satisfaction and was encouraged to call with any further questions. Florida Hines,   Orthopedic Surgery and Sports Medicine  12/13/2022      This dictation was performed with a verbal recognition program [employer]) and it was checked for errors. It is possible that there are still dictated errors within this office note. If so, please bring any errors to my attention for an addendum. All efforts were made to ensure that this office note is accurate.

## 2023-01-04 ENCOUNTER — HOSPITAL ENCOUNTER (OUTPATIENT)
Dept: PHYSICAL THERAPY | Age: 48
Discharge: HOME | End: 2023-01-04

## 2023-01-04 NOTE — FLOWSHEET NOTE
[facility]    Physical Therapy  Cancellation/No-show Note  Patient Name:  Ayesha Owusu  :  1975   Date:  2023    Cancelled visits to date: 1  No-shows to date: 0    For today's appointment patient:  [x]  Cancelled  []  Rescheduled appointment  []  No-show     Reason given by patient:  [x]  Patient ill  []  Conflicting appointment  []  No transportation    []  Conflict with work  []  No reason given  []  Other:     Comments:      Phone call information:   []  Phone call made today to patient. []  Patient answered, conversation as follows:    []  Patient did not answer. [x]  Phone call not needed - pt contacted us to cancel and provided reason for cancellation.      Electronically signed by:  Pearl Pang PT,

## 2023-01-11 ENCOUNTER — HOSPITAL ENCOUNTER (OUTPATIENT)
Dept: PHYSICAL THERAPY | Age: 48
Setting detail: THERAPIES SERIES
Discharge: HOME | End: 2023-01-11
Payer: COMMERCIAL

## 2023-01-11 PROCEDURE — 97161 PT EVAL LOW COMPLEX 20 MIN: CPT

## 2023-01-11 PROCEDURE — 97110 THERAPEUTIC EXERCISES: CPT

## 2023-01-11 NOTE — PLAN OF CARE
[address], [address], [address]  Phone: [phone], [phone]                                                       Physical Therapy Certification    Dear Referring Provider (secondary): Jorje Walker ,    We had the pleasure of evaluating the following patient for physical therapy services at [address] [place] [address]. A summary of our findings can be found in the initial assessment below. This includes our plan of care. If you have any questions or concerns regarding these findings, please do not hesitate to contact me at the office phone number checked above. Thank you for the referral.       Physician Signature:_______________________________Date:__________________  By signing above (or electronic signature), therapists plan is approved by physician    Patient: Jl Barrett   : 1975   MRN: [id number]  Referring Physician: Referring Provider (secondary): [email or web address]       Evaluation Date: 2023      Medical Diagnosis Information:  Diagnosis: M17.12 (ICD-10-CM) - Primary osteoarthritis of left knee   Treatment Diagnosis: M25.562, L knee pain                                       Insurance information: PT Insurance Information: BCBS     Precautions/ Contra-indications/Relevant Medical History: h/o L knee scope x2 (2022)    C-SSRS Triggered by Intake questionnaire (Past 2 wk assessment):   [x] No, Questionnaire did not trigger screening.   [] Yes, Patient intake triggered further evaluation      [] C-SSRS Screening completed  [] PCP notified via Plan of Care  [] Emergency services notified     Latex Allergy:  [x]NO      []YES  Preferred Language for Healthcare:   [x]English       []other:      ASSESSMENT: Leann Mayo is a 53 y.o. female reporting to OP PT with c/c of L knee pain and difficulty with functional mobility which has been occurring since 2022.  Pt is noted to have significantly impaired lumbar, hip and knee ROM, impaired hip and knee strength, antalgic gait, and impaired functional mobility, including sit>stand and supine to sit. Potential lumbar nerve involvement due to sensation impairments and pain with hip and knee movements. Requires skilled PT to address impairments and return to PLOF. SUBJECTIVE: Patient stated complaint: Patient reports L knee pain that started 3-4 years ago and she had a knee scope and menisectomy performed by Dr. Marbella Sanchez. She had a similar procedure in June 2022, but continued to have a lot of pain. She started using the cane after that surgery and has continued using it. She is off work at [employer] and has been off since September. As of now, she is supposed to go back Jan 26, but she isn't sure if that will happen since she hasn't experienced any changes at this point. She didn't have any changes in her pain, so Dr. Marbella Sanchez referred her to Dr. Donell Humphrey, who noted increased sensitivity over the saphenous nerve distribution and started her on gabapentin on 12/13. She hasn't noticed any changes in the pain so far. She has difficulty standing up straight and is unable to bend forward in a seated position. She has difficulty with all functional transfers and requires assist from daughter for sit>stand. Denies bowel/bladder changes.     Functional Test Score: LEFS :92 % (Total: 2/80)     Pain Scale: Current: 4/10  Easing factors: rest  Provocative factors: movement, walking, transfers, sitting     Type: [x]Constant   []Intermittent  [x]Radiating []Localized []other:     Numbness/Tingling: reports tingling/pain down back and front of LLE    Occupation/School: works at [employer] Level of Function: Independent with ADLs and IADLs,     OBJECTIVE:     ROM LEFT RIGHT   HIP Flex 80 deg 75 deg   HIP Abd 5 deg 0 deg   HIP Ext unable unable   HIP IR     HIP ER     Knee ext 0 deg with hip flexed; lacking 30 deg hip neutral 0 deg with hip flexed, lacking 40 deg hip neutral   Knee Flex 80 deg 70 deg   Lumbar flex 30 deg    Lumbar ext Lacking 10 deg from neutral    Ankle In     Ankle Ev     Strength  LEFT RIGHT   HIP Flexors 2/5 2/5   HIP Abductors 1/5 1/5   HIP Ext     Hip ER     Knee EXT (quad) 3/5 2+/5   Knee Flex (HS) 2+/5 2+/5   Ankle DF     Ankle PF     Ankle Inv     Ankle EV          Balance (up to 10 sec) LEFT RIGHT   Feet Together     Off Set Stance     Tandem Stance     Single Limb          Circumference             Reflexes/Sensation:    []Dermatomes/Myotomes intact    []Reflexes equal and normal bilaterally   [x]Other: impaired sensation along entire lower leg    Joint mobility: unable to assess   []Normal    []Hypo   []Hyper    Palpation: tenderness over entire lower leg    Functional Mobility/Transfers: requires min-mod A for all functional transfers    Posture: unable to sit with 90 deg hip flexion; sits with back against chair and hips opened; unable to stand straight and stands with lumbar flexion    Bandages/Dressings/Incisions: n/a    Gait: (include devices/WB status) uses single point cane; decreased L stance time and L lateral trunk lean during stance    Orthopedic Special Tests: sciatic nerve tension test (+)                       [x] Patient history, allergies, meds reviewed. Medical chart reviewed. See intake form. Review Of Systems (ROS):  [x]Performed Review of systems (Integumentary, CardioPulmonary, Neurological) by intake and observation. Intake form has been scanned into medical record. Patient has been instructed to contact their primary care physician regarding ROS issues if not already being addressed at this time.       Co-morbidities/Complexities (which will affect course of rehabilitation):   []None           Arthritic conditions   []Rheumatoid arthritis (M05.9)  []Osteoarthritis (M19.91)   Cardiovascular conditions   [x]Hypertension (I10)  []Hyperlipidemia (E78.5)  []Angina pectoris (I20)  []Atherosclerosis (I70)   Musculoskeletal conditions   []Disc pathology   []Congenital spine pathologies   []Prior surgical intervention  []Osteoporosis (M81.8)  []Osteopenia (M85.8)   Endocrine conditions   []Hypothyroid (E03.9)  []Hyperthyroid Gastrointestinal conditions   []Constipation (X89.27)   Metabolic conditions   [x]Morbid obesity (E66.01)  []Diabetes type 1(E10.65) or 2 (E11.65)   []Neuropathy (G60.9)     Pulmonary conditions   []Asthma (J45)  []Coughing   []COPD (J44.9)   Psychological Disorders  [x]Anxiety (F41.9)  [x]Depression (F32.9)   []Other:   []Other:          Barriers to/and or personal factors that will affect rehab potential:              []Age  []Sex              []Motivation/Lack of Motivation                        [x]Co-Morbidities              []Cognitive Function, education/learning barriers              []Environmental, home barriers              []profession/work barriers  []past PT/medical experience  []other:  Justification: n/a    Falls Risk Assessment (30 days):   [x] Falls Risk assessed and no intervention required.   [] Falls Risk assessed and Patient requires intervention due to being higher risk   TUG score (>12s at risk):     [] Falls education provided, including       G-Codes:       ASSESSMENT:   Functional Impairments:     []Noted lumbar/proximal hip/LE joint hypomobility   [x]Decreased LE functional ROM   [x]Decreased core/proximal hip strength and neuromuscular control   [x]Decreased LE functional strength   [x]Reduced balance/proprioceptive control   []other:      Functional Activity Limitations (from functional questionnaire and intake)   [x]Reduced ability to tolerate prolonged functional positions   [x]Reduced ability or difficulty with changes of positions or transfers between positions   [x]Reduced ability to maintain good posture and demonstrate good body mechanics with sitting, bending, and lifting   [x]Reduced ability to sleep   [x] Reduced ability or tolerance with driving and/or computer work   []Reduced ability to perform lifting, carrying tasks   [x]Reduced ability to squat   [x]Reduced ability to forward bend   [x]Reduced ability to ambulate prolonged functional periods/distances/surfaces   [x]Reduced ability to ascend/descend stairs   [x]Reduced ability to run, hop, cut or jump   []other:    Participation Restrictions   [x]Reduced participation in self care activities   [x]Reduced participation in home management activities   [x]Reduced participation in work activities   [x]Reduced participation in social activities. [x]Reduced participation in sport/recreation activities. Classification :    []Signs/symptoms consistent with post-surgical status including decreased ROM, strength and function.    []Signs/symptoms consistent with joint sprain/strain   []Signs/symptoms consistent with patella-femoral syndrome   [x]Signs/symptoms consistent with knee OA/hip OA and lumbar radiculopathy   []Signs/symptoms consistent with internal derangement of knee/Hip   []Signs/symptoms consistent with functional hip weakness/NMR control      []Signs/symptoms consistent with tendinitis/tendinosis    []signs/symptoms consistent with pathology which may benefit from Dry needling      []other:      Prognosis/Rehab Potential:      []Excellent   [x]Good    []Fair   []Poor    Tolerance of evaluation/treatment:    []Excellent   [x]Good    []Fair   []Poor    Physical Therapy Evaluation Complexity Justification   [x] A history of present problem with:  [] no personal factors and/or comorbidities that impact the plan of care;  [x]1-2 personal factors and/or comorbidities that impact the plan of care  []3 personal factors and/or comorbidities that impact the plan of care  [x] An examination of body systems using standardized tests and measures addressing any of the following: body structures and functions (impairments), activity limitations, and/or participation restrictions;:  [] a total of 1-2 or more elements   [] a total of 3 or more elements   [x] a total of 4 or more elements   [x] A clinical presentation with:  [x] stable and/or uncomplicated characteristics   [] evolving clinical presentation with changing characteristics  [] unstable and unpredictable characteristics;   [x] Clinical decision making of [x] low, [] moderate, [] high complexity using standardized patient assessment instrument and/or measurable assessment of functional outcome. [x] EVAL (LOW) 73813 (typically 20 minutes face-to-face)  [] EVAL (MOD) 76089 (typically 30 minutes face-to-face)  [] EVAL (HIGH) 92337 (typically 45 minutes face-to-face)  [] RE-EVAL     PLAN  Frequency/Duration:  1-2 days per week for up to 12 weeks:  Interventions:  [x]  Therapeutic exercise including: strength training, ROM, for Lower extremity and core   [x]  NMR activation and proprioception for LE, Glutes and Core   [x]  Manual therapy as indicated for LE, Hip and spine to include: Dry Needling/IASTM, STM, PROM, Gr I-IV mobilizations, manipulation. [x] Modalities as needed that may include: thermal agents, E-stim, Biofeedback, US, iontophoresis as indicated  [x] Patient education on joint protection, postural re-education, activity modification, progression of HEP. HEP instruction: Refer to [address] [place] [address] access code and exercises on the 1st visit treatment note    GOALS:  Patient stated goal: Patient wants to be able to walk x 20 minutes without use of assistive device and perform sit>stand independently. Therapist goals for Patient:   Short Term Goals: To be achieved in: 2 weeks  1. Independent in HEP and progression per patient tolerance, in order to prevent re-injury. [] Progressing: [] Met: [] Not Met: [] Adjusted   2. Patient will have a decrease in pain of NRPS to 2/10 facilitate improvement in movement, function, and ADLs as indicated by Functional Deficits. [] Progressing: [] Met: [] Not Met: [] Adjusted     Long Term Goals:  To be achieved in: 12 weeks  Functional Scale Test LEFS score will be </= 50% to assist with reaching prior level of function. [] Progressing: [] Met: [] Not Met: [] Adjusted   2. Patient will demonstrate increased AROM to hip flexion to 100 deg to allow for proper joint functioning as indicated by patients Functional Deficits. [] Progressing: [] Met: [] Not Met: [] Adjusted   3. Patient will demonstrate an increase in Strength to hip flexors to 3+/5 and knee extensors to 3+/5 allow for proper functional mobility as indicated by patients Functional Deficits. [] Progressing: [] Met: [] Not Met: [] Adjusted   4. Patient will return to functional standing activities with NRPS of </= 2/10. [] Progressing: [] Met: [] Not Met: [] Adjusted   5.  Patient wants to be able to walk x 20 minutes without use of assistive device and perform sit>stand independently. (patient specific functional goal)    [] Progressing: [] Met: [] Not Met: [] Adjusted       Electronically signed by:  Ara Ramírez PT

## 2023-01-11 NOTE — FLOWSHEET NOTE
[facility], [address] [place] [address] [address], [address]  Phone: [phone], [phone]    Physical Therapy Treatment Note/ Progress Report:     Date:  2023    Patient Name:  Scar Richards    :  1975  MRN: [id number]  Restrictions/Precautions:    Medical/Treatment Diagnosis Information:  Diagnosis: M17.12 (ICD-10-CM) - Primary osteoarthritis of left knee   Treatment Diagnosis: M25.562, L knee pain               Insurance/Certification information:  PT Insurance Information: [facility]  Physician Information:  Referring Provider (secondary): Sheri Danielle  Has the plan of care been signed (Y/N):        []  Yes  [x]  No     Date of Patient follow up with Physician: n/a    Is this a Progress Report:     []  Yes  [x]  No      If Yes:  Date Range for reporting period:  Initial Eval: 2023  Beginnin2023 --- Endin/10/23    Progress report will be due (10 Rx or 30 days whichever is less):      Recertification will be due (POC Duration  / 90 days whichever is less): 23      Visit # Insurance Allowable Auth Required   In Person 1 Unknown (auth req) []  Yes     []  No    Tele Health 0  []  Yes     []  No    Total 1       Functional Test Score: LEFS :92 % (Total: )  Date assessed: 2023      Latex Allergy:  [x]NO      []YES    Preferred Language for Healthcare:   [x]English       []other:    Pain level:  4/10     SUBJECTIVE:  See eval    OBJECTIVE: See eval  Observation:   Test measurements:      RESTRICTIONS/PRECAUTIONS:  h/o L knee scope x2 (2022)    Exercises/Interventions:   Therapeutic Ex (45971)  Therapeutic Activity (86230)  NMR re-education (97334) Sets/Reps Notes/CUES   Bike          PPT 10x10\"    SAQ 2x10         Seated lumbar flexion stretch 3x30\"    Seated knee flexion stretch 3x30\"    Seated HS stretch with and without stretch 3x30\"         Standing lumbar flexion counter 10x10\"    Standing marches 10x Walker education                    Manual Intervention (07656)                                        Crystal Lynch access code: [email or web address]               Patient Education         Therapeutic Exercise and NMR EXR  [x] (30757) Provided verbal/tactile cueing for activities related to strengthening, flexibility, endurance, ROM for improvements in LE, proximal hip, and core control with self care, mobility, lifting, ambulation. [x] (83726) Provided verbal/tactile cueing for activities related to improving balance, coordination, kinesthetic sense, posture, motor skill, proprioception to assist with LE, proximal hip, and core control in self-care, mobility, lifting, ambulation and eccentric single leg control. NMR and Therapeutic Activities:    [x] (52877 or 39135) Provided verbal/tactile cueing for activities related to improving balance, coordination, kinesthetic sense, posture, motor skill, proprioception and motor activation to allow for proper function of core, proximal hip and LE with self-care and ADLs and functional mobility.    [x] (78845) Gait Re-education- Provided training and instruction to the patient for proper LE, core and proximal hip recruitment and positioning and eccentric body weight control with ambulation re-education including up and down stairs     Home Exercise Program:    [x] (65140) Reviewed/Progressed HEP activities related to strengthening, flexibility, endurance, ROM of core, proximal hip and LE for functional self-care, mobility, lifting and ambulation/stair navigation   [x] (97716) Reviewed/Progressed HEP activities related to improving balance, coordination, kinesthetic sense, posture, motor skill, proprioception of core, proximal hip and LE for self-care, mobility, lifting, and ambulation/stair navigation      Manual Treatments:  PROM / STM / Oscillations-Mobs:  G-I, II, III, IV (PA's, Inf., Post.)  [x] (83900) Provided manual therapy to mobilize LE, proximal hip and/or LS spine soft tissue/joints for the purpose of modulating pain, promoting relaxation, increasing ROM, reducing/eliminating soft tissue swelling/inflammation/restriction, improving soft tissue extensibility and allowing for proper ROM for normal function with self-care, mobility, lifting and ambulation. Modalities:     [] GAME READY (VASO)- for significant edema, swelling, pain control. Charges:  Timed Code Treatment Minutes: 30   Total Treatment Minutes:  80   BWC:  TE TIME:  NMR TIME:  MANUAL TIME:  UNTIMED MINUTES:  Medicare Total:    N/a  N/a  N/a  N/a  N/a      [x] EVAL (LOW) 70659 (typically 20 minutes face-to-face)  [] EVAL (MOD) 80548 (typically 30 minutes face-to-face)  [] EVAL (HIGH) 64875 (typically 45 minutes face-to-face)  [] RE-EVAL     [x] BG(16100) x  2   [] IONTO  [] NMR (19893) x     [] VASO  [] Manual (09028) x     [] Other:  [] TA x      [] Mech Traction (49530)  [] ES(attended) (37523)      [] ES (un) (95091):    ASSESSMENT SUMMARY:  [address] Dee is a 53 y.o. female reporting to OP PT with c/c of L knee pain and difficulty with functional mobility which has been occurring since June 2022. Pt is noted to have significantly impaired lumbar, hip and knee ROM, impaired hip and knee strength, antalgic gait, and impaired functional mobility, including sit>stand and supine to sit. Potential lumbar nerve involvement due to sensation impairments and pain with hip and knee movements. Requires skilled PT to address impairments and return to PLOF. Patient received education on their current pathology and how their condition effects them with their functional activities. Patient understood discussion and questions were answered. Patient understands their activity limitations and understands rational for treatment progression. Pt educated on plan of care and HEP, if worsening symptoms to d/c that exercise. GOALS:   Short Term Goals: To be achieved in: 2 weeks  1.  Independent in HEP and progression per patient tolerance, in order to prevent re-injury. [] Progressing: [] Met: [] Not Met: [] Adjusted   2. Patient will have a decrease in pain of NRPS to 2/10 facilitate improvement in movement, function, and ADLs as indicated by Functional Deficits. [] Progressing: [] Met: [] Not Met: [] Adjusted      Long Term Goals: To be achieved in: 12 weeks  Functional Scale Test LEFS score will be </= 50% to assist with reaching prior level of function. [] Progressing: [] Met: [] Not Met: [] Adjusted   2. Patient will demonstrate increased AROM to hip flexion to 100 deg to allow for proper joint functioning as indicated by patients Functional Deficits. [] Progressing: [] Met: [] Not Met: [] Adjusted   3. Patient will demonstrate an increase in Strength to hip flexors to 3+/5 and knee extensors to 3+/5 allow for proper functional mobility as indicated by patients Functional Deficits. [] Progressing: [] Met: [] Not Met: [] Adjusted   4. Patient will return to functional standing activities with NRPS of </= 2/10. [] Progressing: [] Met: [] Not Met: [] Adjusted   5. Patient wants to be able to walk x 20 minutes without use of assistive device and perform sit>stand independently. (patient specific functional goal)    [] Progressing: [] Met: [] Not Met: [] Adjusted          (Cut Carl Phillips from eval)    Overall Progression Towards Functional goals/ Treatment Progress Update:  [] Patient is progressing as expected towards functional goals listed. [] Progression is slowed due to complexities/Impairments listed. [] Progression has been slowed due to co-morbidities.   [x] Plan just implemented, too soon to assess goals progression <30days   [] Goals require adjustment due to lack of progress  [] Patient is not progressing as expected and requires additional follow up with physician  [] Other    Prognosis for POC: [x] Good [] Fair  [] Poor    Patient requires continued skilled intervention: [x] Yes  [] No    Treatment/Activity Tolerance:  [x] Patient able to complete treatment  [] Patient limited by fatigue  [] Patient limited by pain    [] Patient limited by other medical complications  [] Other:     Return to Play: (if applicable)   []  Stage 1: Intro to Strength   []  Stage 2: Return to Run and Strength   []  Stage 3: Return to Jump and Strength   []  Stage 4: Dynamic Strength and Agility   []  Stage 5: Sport Specific Training     []  Ready to Return to Play, Meets All Above Stages   []  Not Ready for Return to Sports   Comments:                         PLAN: See eval  [] Continue per plan of care [] Alter current plan (see comments above)  [x] Plan of care initiated [] Hold pending MD visit [] Discharge    Electronically signed by:  Jeanna Estevez PT    Note: If patient does not return for scheduled/ recommended follow up visits, this note will serve as a discharge from care along with most recent update on progress.

## 2023-01-17 ENCOUNTER — OFFICE VISIT (OUTPATIENT)
Dept: ORTHOPEDIC SURGERY | Age: 48
End: 2023-01-17

## 2023-01-17 VITALS — WEIGHT: 225 LBS | HEIGHT: 62 IN | BODY MASS INDEX: 41.41 KG/M2

## 2023-01-17 DIAGNOSIS — M54.50 LUMBAR PAIN: Primary | ICD-10-CM

## 2023-01-17 DIAGNOSIS — M47.12 CERVICAL SPONDYLOSIS WITH MYELOPATHY: ICD-10-CM

## 2023-01-17 NOTE — LETTER
[address] [address]  Phone: [phone]  Fax: [phone]    Shanice Velazquez DO        January 17, 2023     Patient: Sofy Reyes   YOB: 1975   Date of Visit: 1/17/2023       To Whom It May Concern: It is my medical opinion that Patti Jackson ***. If you have any questions or concerns, please don't hesitate to call.     Sincerely,        Shanice Velazquez DO

## 2023-01-17 NOTE — PROGRESS NOTES
Chief Complaint  Knee Pain (L knee)      History of Present Illness:  The patient is here today for repeat evaluation regarding her left knee.  The patient continues to have pain.  She does not report much relief from the PT or from the nerve medicine.  She is noting a little bit improvement in the motion to her knee.  However she is noting a lot of hip motion restrictions to both hips while working with the physical therapist.  She denies much hip pain.  She is also reporting some findings concerning for potential cervical issues as she reports that several times a week when she turns her head too far 1 where the other she gets burning and numbness and tingling into both of her arms and hands.  She is also noticing some worsening problems with balance and she did have 1 instance of loss of bladder control while in the car.    Prior HPI 12/13/2022:  Marium Holland is a pleasant 48 y.o. female here today for established patient evaluation regarding her left knee.  She was referred to me by Dr. Trent.  The patient has had a long history with her left knee.  She underwent a left knee arthroscopy with chondroplasty of the patella and trochlear groove as well as ACL debridement by Dr. Trent on 6/13/2022.  The patient did not get much relief from the procedure.  She underwent gel and steroid injections without much improvement.  A repeat MRI was performed which demonstrated a complete ACL tear and severe chondromalacia to the patellofemoral joint.  The patient was referred to me for evaluation for her knee for other potential treatment options.  The patient unfortunately cannot take anti-inflammatories that she was taking them for quite some time and this started to damage her kidney and liver per her PCP.    Pain Assessment  Location of Pain: Knee  Location Modifiers: Left  Severity of Pain: 7  Quality of Pain: Dull, Sharp, Aching, Throbbing, Locking, Grinding, Popping, Cracking, Buckling  Duration of Pain:  Persistent  Frequency of Pain: Constant  Aggravating Factors: Bending, Stretching, Straightening, Kneeling, Exercise, Squatting, Standing, Walking, Stairs  Limiting Behavior: Yes  Relieving Factors: Other (Comment)  Result of Injury: Yes  Work-Related Injury: No  Are there other pain locations you wish to document?: No    Medical History:  Patient's medications, allergies, past medical, surgical, social and family histories were reviewed and updated as appropriate. Pertinent items are noted in HPI  Review of systems reviewed from Patient History Form dated on 1/17/23 and available in the patient's chart under the Media tab. Vital Signs: There were no vitals filed for this visit. Constitutional: In no apparent distress. Normal affect. Alert and oriented X3 and is cooperative. Left knee exam    Improved exam compared to prior visit. Continued exquisite tenderness along the saphenous distribution along the medial thigh, medial joint line, and medial aspect of the anterior medial tibia. Stable to varus and valgus stress. Knee range of motion 0 to 80 degrees. Unable to perform a ligamentous exam secondary to guarding. Cervical spine examination demonstrates tenderness along the lower half of her cervical spine as well as limited range of motion in all planes. Positive Spurling's to the left upper extremity. Sensation intact from the C5-T1 distributions to all paresthesias in bilateral upper extremities. Positive clonus to the right lower extremity    Radiology:       2 views of the lumbar spine taken in the office today demonstrate no acute osseous abnormality. Overall visualization poor on the lateral but there appears to be well-maintained lumbar lordosis. Overall well-maintained disc spaces throughout all levels. Some mild narrowing noted at L3-L4 and L4-L5. AP demonstrates overall neutral alignment.   She does have severe bilateral hip osteoarthritis    4 views of the cervical spine taken in the office today demonstrate severe multilevel cervical spine arthritis with severe anterior osteophyte formation and loss of cervical lordosis. Assessment : 81-year-old female with left knee severe patellofemoral chondromalacia, saphenous neuritis versus lumbar radiculopathy, severe bilateral hip osteoarthritis, severe cervical spondylosis with myelopathy    Impression:  Encounter Diagnosis   Name Primary? Lumbar pain Yes       Office Procedures:  Orders Placed This Encounter   Procedures    XR LUMBAR SPINE (2-3 VIEWS)     Standing Status:   Future     Number of Occurrences:   1     Standing Expiration Date:   1/17/2024    XR CERVICAL SPINE (4-5 VIEWS)     Standing Status:   Future     Standing Expiration Date:   1/17/2024         Plan:     I had a very long discussion with the patient today. The patient does continue to have pain to her knee and is difficult to tell if this is from a saphenous neuritis or from a potential referred pattern from her severe hip arthritis. However more concerning to me is the fact that she has severe findings on her cervical spine and cervical exam.  She does demonstrate severe arthritis in the neck as well as myelopathic changes. Given these findings I will get her set up urgently with our spine team for evaluation of her cervical spine. I will hold off on any other steroid treatment at this time. Continue with the gabapentin for now. Continue with PT for the left knee as she still has stiffness and I would like her to continue to improve her flexion there is losing the motion is difficult to get back. However I believe most of her issue is likely related to her cervical spine. Is difficult to tell if the cervical spine changes are leading to the pain and issues in her left leg but I think we should defer further evaluation of this until her cervical spine can be treated.   When the patient is stabilized from her cervical spine she can see me at any time and I will perform an x-ray guided left hip injection if she still has left knee pain to see if her left knee pain is coming from a referral pattern from her left hip arthritis. Valerio Coleman is in agreement with this plan. All questions were answered to patient's satisfaction and was encouraged to call with any further questions. I spent 60 minutes with the patient today evaluating multiple x-rays and discussing her treatment options based on these findings for her cervical and lumbar spine. Chencho Gaxiola, DO  Orthopedic Surgery and Sports Medicine  1/17/2023      This dictation was performed with a verbal recognition program [facility]) and it was checked for errors. It is possible that there are still dictated errors within this office note. If so, please bring any errors to my attention for an addendum. All efforts were made to ensure that this office note is accurate.

## 2023-01-17 NOTE — LETTER
[address] [place] [address]  Phone: [phone]  Fax: [phone]    Carolyn Us, DO        January 17, 2023     Patient: Taina Diaz   YOB: 1975   Date of Visit: 1/17/2023       To Whom it May Concern:    Jadon Arias was seen in my clinic on 1/17/2023. She may return to work on 02/28/2023. No driving or lifting until evaluated by the spine team due to spinal compression    If you have any questions or concerns, please don't hesitate to call.     Sincerely,       Carolyn Ask, DO  Orthopedic Surgery and Sports Medicine

## 2023-01-18 ENCOUNTER — HOSPITAL ENCOUNTER (OUTPATIENT)
Dept: PHYSICAL THERAPY | Age: 48
Setting detail: THERAPIES SERIES
Discharge: HOME | End: 2023-01-18
Payer: COMMERCIAL

## 2023-01-18 NOTE — FLOWSHEET NOTE
Matthew Rosa, [place]    Physical Therapy  Cancellation/No-show Note  Patient Name:  Geraldine Kwok  :  1975   Date:  2023    Cancelled visits to date: 0  No-shows to date: 1    For today's appointment patient:  []  Cancelled  []  Rescheduled appointment  [x]  No-show     Reason given by patient:  []  Patient ill  []  Conflicting appointment  []  No transportation    []  Conflict with work  []  No reason given  []  Other:     Comments:      Phone call information:   [x]  Phone call made today to patient. []  Patient answered, conversation as follows:    [x]  Patient did not answer. []  Phone call not needed - pt contacted us to cancel and provided reason for cancellation.      Electronically signed by:  Cedrick Ames PT,

## 2023-02-01 ENCOUNTER — OFFICE VISIT (OUTPATIENT)
Dept: ORTHOPEDIC SURGERY | Age: 48
End: 2023-02-01
Payer: COMMERCIAL

## 2023-02-01 VITALS — BODY MASS INDEX: 41.41 KG/M2 | HEIGHT: 62 IN | WEIGHT: 225 LBS

## 2023-02-01 DIAGNOSIS — M54.12 CERVICAL RADICULOPATHY: ICD-10-CM

## 2023-02-01 DIAGNOSIS — M47.12 CERVICAL SPONDYLOSIS WITH MYELOPATHY: Primary | ICD-10-CM

## 2023-02-01 DIAGNOSIS — M48.02 CERVICAL STENOSIS OF SPINE: ICD-10-CM

## 2023-02-01 PROCEDURE — 99214 OFFICE O/P EST MOD 30 MIN: CPT | Performed by: PHYSICIAN ASSISTANT

## 2023-02-01 NOTE — PROGRESS NOTES
New Patient: CERVICAL SPINE    Referring Provider:  Gianluca Mayo DO    CHIEF COMPLAINT:    Chief Complaint   Patient presents with    Neck Pain     cervical    Back Pain     lumbar       HISTORY OF PRESENT ILLNESS:   Ms. Heather Hernandez is a pleasant 53 y.o. old female here for consultation regarding her neck and bilateral arm pain. She states the pain began possibly after tripping up steps injuring her knee approximately 3 to 4 years ago. She states that her pain in her neck has gradually increased over the past few months and she is now feeling weakness into her upper and lower extremities. She states that her current neck pain is 7/10, bilateral shoulder and arm pain 6/10, lower back pain 6/10 bilateral buttock pain 6/10. She has numbness and tingling over the deltoids bilaterally with radiation of numbness and tingling in a C5-6 and C6-7 distribution into both hands equally. She is left-hand dominant. She also feels at times on balance possibly due to her knee pain and she does ambulate with a forward flexed position using the cane. She finds that her symptoms are worse with prolonged sitting, standing, rising from a seated position, leaning forward, walking and at times lying down and some improvement is with sitting and at times lying down. She does have a sense of weakness into both lower extremities. She denies any bowel or bladder dysfunction or saddle anesthesia. The pain at times interferes with her sleep. Pain Assessment  Location of Pain: Neck  Severity of Pain: 7  Quality of Pain: Other (Comment)  Duration of Pain: Other (Comment)  Frequency of Pain: Other (Comment)  Aggravating Factors: Other (Comment)  Relieving Factors:  Other (Comment)]  Current/Past Treatment:   Physical Therapy: None  Chiropractic: None  Injection: None  Medications: Gabapentin  Past Medical History:   Past Medical History:   Diagnosis Date    Hypertension     Migraines         Past Surgical History:     Past Surgical History:   Procedure Laterality Date    HYSTERECTOMY (CERVIX STATUS UNKNOWN)      KNEE ARTHROSCOPY Left 11/22/2019    LEFT KNEE VIDEO ARTHROSCOPY, CHONDROPLASTY OF PATELLA, ANTERIOR CRUCIATE LIGAMENT DEBRIDEMENT performed by Saturnino Mckeon MD at [facility] 9 ARTHROSCOPY Left 6/10/2022    LEFT KNEE VIDEOARTHROSCOPY WITH CHONDROPLASTY OF PATELLA performed by Saturnino Mckeon MD at [place] [address] Left 11/22/2019    knee arthroscopy, chodrplasty of patella, debridement of ACL    TUBAL LIGATION         Current Medications:     Current Outpatient Medications:     gabapentin (NEURONTIN) 300 MG capsule, Take 1 capsule by mouth nightly for 60 days. , Disp: 60 capsule, Rfl: 0    predniSONE (DELTASONE) 5 MG tablet, TAKE 10 THE FIRST DAY, DECREASE BY ONE EACH DAY UNTIL GONE, Disp: 55 tablet, Rfl: 0    Cyanocobalamin (VITAMIN B 12 PO), Take by mouth, Disp: , Rfl:     meloxicam (MOBIC) 15 MG tablet, Take 1 tablet by mouth daily, Disp: 30 tablet, Rfl: 3    lisinopril (PRINIVIL;ZESTRIL) 10 MG tablet, Take 10 mg by mouth daily, Disp: , Rfl:     buPROPion (WELLBUTRIN SR) 100 MG extended release tablet, Take 100 mg by mouth 2 times daily, Disp: , Rfl:     amitriptyline (ELAVIL) 10 MG tablet, Take 10 mg by mouth nightly, Disp: , Rfl:     Allergies:  Imitrex [sumatriptan]    Social History:    reports that she has never smoked. She has never used smokeless tobacco. She reports that she does not currently use drugs. Family History:   History reviewed. No pertinent family history.     REVIEW OF SYSTEMS: Full ROS noted & scanned   CONSTITUTIONAL: Denies unexplained weight loss, fevers, chills or fatigue  NEUROLOGICAL: Denies unsteady gait or progressive weakness  MUSCULOSKELETAL: Denies joint swelling or redness  PSYCHOLOGICAL: Denies anxiety, depression   SKIN: Denies skin changes, delayed healing, rash, itching   HEMATOLOGIC: Denies easy bleeding or bruising  ENDOCRINE: Denies excessive thirst, urination, heat/cold  RESPIRATORY: Denies current dyspnea, cough  GI: Denies nausea, vomiting, diarrhea   : Denies bowel or bladder issues       PHYSICAL EXAM:    Vitals: Height 5' 2.01\" (1.575 m), weight 225 lb (102.1 kg). GENERAL EXAM:  General Apparence: Patient is adequately groomed with no evidence of malnutrition. Orientation: The patient is oriented to time, place and person. Mood & Affect:The patient's mood and affect are appropriate   Vascular: Examination reveals no swelling tenderness in upper or lower extremities. Good capillary refill  Lymphatic: The lymphatic examination bilaterally reveals all areas to be without enlargement or induration  Sensation: Sensation is intact without deficit  Coordination/Balance: Good coordination. Tandem walking difficult due to balance    CERVICAL EXAMINATION:  Inspection: Local inspection shows no step-off or bruising. Cervical alignment is normal.     Palpation: Diffuse tenderness at the midline, and trapezius. Paraspinal tenderness is present. There is no step-off or paraspinal spasm. Range of Motion: Cervical flexion, extension, and rotation are moderately reduced with pain. Strength: 5/5 bilateral upper extremities   Special Tests:     Spurling's negative & Arnold's negative bilaterally. Cubital and Carpal tunnel Tinel's negative bilaterally. Skin:There are no rashes, ulcerations or lesions in right & left upper extremities. Reflexes: Bilaterally triceps, biceps and brachioradialis are 2+. 3 beats of clonus presently bilaterally at the feet. Gait & station: normal, patient ambulates without assistance       Additional Examinations:       RIGHT UPPER EXTREMITY:  Inspection/examination of the right upper extremity does not show any tenderness, deformity or injury. Range of motion is unremarkable. There is no gross instability. There are no rashes, ulcerations or lesions.  Strength and tone are normal.  LEFT UPPER EXTREMITY: Inspection/examination of the left upper extremity does not show any tenderness, deformity or injury. Range of motion is unremarkable. There is no gross instability. There are no rashes, ulcerations or lesions. Strength and tone are normal.  LUMBAR/SACRAL EXAMINATION:  Inspection: Local inspection shows no step-off or bruising. Lumbar alignment is normal.  Sagittal and Coronal balance is neutral.      Palpation:   No evidence of tenderness at the midline. No tenderness bilaterally at the paraspinal or trochanters. There is no step-off or paraspinal spasm. Range of Motion: Lumbar flexion, extension and rotation are mildly limited due to pain. Strength:   Strength testing is 4/5 in all muscle groups tested bilaterally. Special Tests:   Straight leg raise and crossed SLR negative. Leg length and pelvis level. Skin: There are no rashes, ulcerations or lesions. Reflexes: Reflexes are symmetrically 2+ at the patellar and ankle tendons. 3 beats of clonus bilaterally at the feet   Gait & station: Antalgic with a cane  Additional Examinations:   RIGHT LOWER EXTREMITY: Inspection/examination of the right lower extremity does not show any tenderness, deformity or injury. Range of motion is unremarkable. There is no gross instability. There are no rashes, ulcerations or lesions. Strength and tone are normal.  LEFT LOWER EXTREMITY:  Inspection/examination of the left lower extremity does not show any tenderness, deformity or injury. Range of motion is unremarkable. There is no gross instability. There are no rashes, ulcerations or lesions. Strength and tone are normal.    Diagnostic Testing:  X-rays: X-rays of the cervical spine that were obtained on 1/17/2023 was independently reviewed with the patient which shows DDD of the cervical spine with multilevel disc space narrowing with anterior osteophyte formation. There is flattening of the cervical lordosis.     X-rays of the lumbar spine that were obtained on 1/17/2023 were independently reviewed with the patient which shows well-maintained lumbar lordosis with disc space narrowing mainly at L3-4 and L4-5. Impression:   DDD cervical spine  Cervical spondylosis with myelopathy  DDD lumbar spine      Plan:      We discussed the diagnosis and treatment options including observation, physical therapy, epidural injections or additional imaging. She wishes to proceed with MRI of the cervical spine to further evaluate the spondylosis with myelopathy and we are going to obtain EMG of both upper and lower extremities. Follow Up: After MRI and EMG to review the results and discuss further treatment options. Old records were reviewed.     Total evaluation time 38 minutes    Jeannette Alvarado PA-C  Board certified by the [address]

## 2023-02-20 ENCOUNTER — OFFICE VISIT (OUTPATIENT)
Dept: ORTHOPEDIC SURGERY | Age: 48
End: 2023-02-20
Payer: COMMERCIAL

## 2023-02-20 VITALS — HEIGHT: 62 IN | BODY MASS INDEX: 41.41 KG/M2 | WEIGHT: 225 LBS

## 2023-02-20 DIAGNOSIS — M25.562 ACUTE PAIN OF LEFT KNEE: ICD-10-CM

## 2023-02-20 DIAGNOSIS — M17.12 PRIMARY OSTEOARTHRITIS OF LEFT KNEE: ICD-10-CM

## 2023-02-20 DIAGNOSIS — M48.02 FORAMINAL STENOSIS OF CERVICAL REGION: ICD-10-CM

## 2023-02-20 DIAGNOSIS — M51.36 DDD (DEGENERATIVE DISC DISEASE), LUMBAR: Primary | ICD-10-CM

## 2023-02-20 PROCEDURE — 99214 OFFICE O/P EST MOD 30 MIN: CPT | Performed by: PHYSICIAN ASSISTANT

## 2023-02-20 RX ORDER — GABAPENTIN 300 MG/1
300 CAPSULE ORAL NIGHTLY
Qty: 60 CAPSULE | Refills: 0 | Status: SHIPPED | OUTPATIENT
Start: 2023-02-20 | End: 2023-04-21

## 2023-02-20 NOTE — LETTER
[facility]  [address] [facility] [address]  Phone: [phone]  Fax: [phone]    Alexander Rich PA-C        February 20, 2023     Patient: Marium Holland   YOB: 1975   Date of Visit: 2/20/2023       To Whom It May Concern:    It is my medical opinion that Marium Holland should remain out of work until 3/20/2023.    If you have any questions or concerns, please don't hesitate to call.    Sincerely,        Alexander Rich PA-C

## 2023-02-20 NOTE — PROGRESS NOTES
New Patient: CERVICAL SPINE    Referring Provider:  No ref. provider found    CHIEF COMPLAINT:    Chief Complaint   Patient presents with    Back Pain     lumbar         HISTORY OF PRESENT ILLNESS:   Ms. Joshua Pinedo is a pleasant 53 y.o. old female here for follow-up regarding her neck and bilateral arm pain. She states the pain began possibly after tripping up steps injuring her knee approximately 3 to 4 years ago. She states that her pain in her neck has gradually increased over the past few months and she is now feeling weakness into her upper and lower extremities. She states that her current neck pain is 7/10, bilateral shoulder and arm pain 6/10, lower back pain 6/10 bilateral buttock pain 6/10. She has numbness and tingling over the deltoids bilaterally with radiation of numbness and tingling in a C5-6 and C6-7 distribution into both hands equally. She is left-hand dominant. She also feels at times on balance possibly due to her knee pain and she does ambulate with a forward flexed position using the cane. She finds that her symptoms are worse with prolonged sitting, standing, rising from a seated position, leaning forward, walking and at times lying down and some improvement is with sitting and at times lying down. She does have a sense of weakness into both lower extremities. Patient states that her current back pain is 5/10 with intermittent radiation into the anterior lateral aspect of the left greater than right leg to her knees. She denies any bowel or bladder dysfunction or saddle anesthesia. The pain at times interferes with her sleep. Pain Assessment  Location of Pain: Back  Severity of Pain: 5  Duration of Pain: Other (Comment)  Frequency of Pain: Other (Comment)  Aggravating Factors:  Other (Comment)]  Current/Past Treatment:   Physical Therapy: None  Chiropractic: None  Injection: None  Medications: Gabapentin  Past Medical History:   Past Medical History:   Diagnosis Date Hypertension     Migraines         Past Surgical History:     Past Surgical History:   Procedure Laterality Date    HYSTERECTOMY (CERVIX STATUS UNKNOWN)      KNEE ARTHROSCOPY Left 11/22/2019    LEFT KNEE VIDEO ARTHROSCOPY, CHONDROPLASTY OF PATELLA, ANTERIOR CRUCIATE LIGAMENT DEBRIDEMENT performed by Natty Greenberg MD at [facility] 9 ARTHROSCOPY Left 6/10/2022    LEFT KNEE VIDEOARTHROSCOPY WITH CHONDROPLASTY OF PATELLA performed by Natty Greenberg MD at [place] [id number] Left 11/22/2019    knee arthroscopy, chodrplasty of patella, debridement of ACL    TUBAL LIGATION         Current Medications:     Current Outpatient Medications:     gabapentin (NEURONTIN) 300 MG capsule, Take 1 capsule by mouth nightly for 60 days. , Disp: 60 capsule, Rfl: 0    predniSONE (DELTASONE) 5 MG tablet, TAKE 10 THE FIRST DAY, DECREASE BY ONE EACH DAY UNTIL GONE, Disp: 55 tablet, Rfl: 0    Cyanocobalamin (VITAMIN B 12 PO), Take by mouth, Disp: , Rfl:     meloxicam (MOBIC) 15 MG tablet, Take 1 tablet by mouth daily, Disp: 30 tablet, Rfl: 3    lisinopril (PRINIVIL;ZESTRIL) 10 MG tablet, Take 10 mg by mouth daily, Disp: , Rfl:     buPROPion (WELLBUTRIN SR) 100 MG extended release tablet, Take 100 mg by mouth 2 times daily, Disp: , Rfl:     amitriptyline (ELAVIL) 10 MG tablet, Take 10 mg by mouth nightly, Disp: , Rfl:     Allergies:  Imitrex [sumatriptan]    Social History:    reports that she has never smoked. She has never used smokeless tobacco. She reports that she does not currently use drugs. Family History:   History reviewed. No pertinent family history.     REVIEW OF SYSTEMS: Full ROS noted & scanned   CONSTITUTIONAL: Denies unexplained weight loss, fevers, chills or fatigue  NEUROLOGICAL: Denies unsteady gait or progressive weakness  MUSCULOSKELETAL: Denies joint swelling or redness  PSYCHOLOGICAL: Denies anxiety, depression   SKIN: Denies skin changes, delayed healing, rash, itching   HEMATOLOGIC: Denies easy bleeding or bruising  ENDOCRINE: Denies excessive thirst, urination, heat/cold  RESPIRATORY: Denies current dyspnea, cough  GI: Denies nausea, vomiting, diarrhea   : Denies bowel or bladder issues       PHYSICAL EXAM:    Vitals: Height 5' 2.01\" (1.575 m), weight 225 lb (102.1 kg). GENERAL EXAM:  General Apparence: Patient is adequately groomed with no evidence of malnutrition. Orientation: The patient is oriented to time, place and person. Mood & Affect:The patient's mood and affect are appropriate   Vascular: Examination reveals no swelling tenderness in upper or lower extremities. Good capillary refill  Lymphatic: The lymphatic examination bilaterally reveals all areas to be without enlargement or induration  Sensation: Sensation is intact without deficit  Coordination/Balance: Good coordination. Tandem walking difficult due to balance    CERVICAL EXAMINATION:  Inspection: Local inspection shows no step-off or bruising. Cervical alignment is normal.     Palpation: Diffuse tenderness at the midline, and trapezius. Paraspinal tenderness is present. There is no step-off or paraspinal spasm. Range of Motion: Cervical flexion, extension, and rotation are moderately reduced with pain. Strength: 5/5 bilateral upper extremities   Special Tests:     Spurling's negative & Arnold's negative bilaterally. Cubital and Carpal tunnel Tinel's negative bilaterally. Skin:There are no rashes, ulcerations or lesions in right & left upper extremities. Reflexes: Bilaterally triceps, biceps and brachioradialis are 2+. 3 beats of clonus presently bilaterally at the feet. Gait & station: normal, patient ambulates without assistance       Additional Examinations:       RIGHT UPPER EXTREMITY:  Inspection/examination of the right upper extremity does not show any tenderness, deformity or injury. Range of motion is unremarkable. There is no gross instability. There are no rashes, ulcerations or lesions.  Strength and tone are normal.  LEFT UPPER EXTREMITY: Inspection/examination of the left upper extremity does not show any tenderness, deformity or injury. Range of motion is unremarkable. There is no gross instability. There are no rashes, ulcerations or lesions. Strength and tone are normal.  LUMBAR/SACRAL EXAMINATION:  Inspection: Local inspection shows no step-off or bruising. Lumbar alignment is normal.  Sagittal and Coronal balance is neutral.      Palpation:   No evidence of tenderness at the midline. No tenderness bilaterally at the paraspinal or trochanters. There is no step-off or paraspinal spasm. Range of Motion: Lumbar flexion, extension and rotation are mildly limited due to pain. Strength:   Strength testing is 4/5 in all muscle groups tested bilaterally. Special Tests:   Straight leg raise and crossed SLR negative. Leg length and pelvis level. Skin: There are no rashes, ulcerations or lesions. Reflexes: Reflexes are symmetrically 2+ at the patellar and ankle tendons. 3 beats of clonus bilaterally at the feet   Gait & station: Antalgic with a cane  Additional Examinations:   RIGHT LOWER EXTREMITY: Inspection/examination of the right lower extremity does not show any tenderness, deformity or injury. Range of motion is unremarkable. There is no gross instability. There are no rashes, ulcerations or lesions. Strength and tone are normal.  LEFT LOWER EXTREMITY:  Inspection/examination of the left lower extremity does not show any tenderness, deformity or injury. Range of motion is unremarkable. There is no gross instability. There are no rashes, ulcerations or lesions. Strength and tone are normal.    Diagnostic Testing:  X-rays: X-rays of the cervical spine that were obtained on 1/17/2023 was independently reviewed with the patient which shows DDD of the cervical spine with multilevel disc space narrowing with anterior osteophyte formation. There is flattening of the cervical lordosis.     X-rays of the lumbar spine that were obtained on 1/17/2023 were independently reviewed with the patient which shows well-maintained lumbar lordosis with disc space narrowing mainly at L3-4 and L4-5. MRI of the cervical spine that was obtained on 2/13/2023 shows multilevel degenerative changes with severe foraminal stenosis on the right at C3-4 and C4-5. There is moderate foraminal stenosis bilaterally at C2-3 and on the right at C5-6. There is moderate central canal stenosis at C4-5. She has mild foraminal stenosis on the left at C3-4 and on the left that 4 5. Mild central canal stenosis at C3-4, C5-6, C6-7. There is no cord signal abnormality seen. EMG of both the upper and lower extremities that was obtained on 2/16/2023 shows no evidence of active left cervical radiculopathy, peripheral neuropathy, plexopathy, myopathy or motor neuron disease. There is no evidence of left lower extremity peripheral neuropathy, plexopathy, myopathy or motor neuron disease noted. There is no evidence of right or left carpal tunnel syndrome. There is no evidence of upper extremity peripheral neuropathy noted. Impression:   DDD cervical spine  Cervical spondylosis   Cervical foraminal stenosis severe C3-4 and C4-5  Moderate central spinal stenosis C4-5  DDD lumbar spine  Lumbar spondylosis with radiculopathy    Plan:      We discussed the diagnosis and treatment options including observation, physical therapy, epidural injections or additional imaging. She wishes to proceed with patient physical therapy for range of motion progressive strengthening exercises both for the cervical and lumbar spine. Modalities of choice for both the upper and lower extremities with dry needling as needed. She was given a refill for her gabapentin that she may take up to 3 times a day. She was also referred to Dr. Chantal Villarreal for cervical injections.   We are going to obtain an MRI of the lumbar spine to further evaluate her lumbar radiculopathy. She was given a work note stating she is off work for the next month. Follow Up: After MRI of her lumbar spine to review the results and to discuss treatment options. .    Old records were reviewed.     Total evaluation time 31 minutes    Sheree Dumont PA-C  Board certified by the [address] [place]

## 2023-03-06 ENCOUNTER — HOSPITAL ENCOUNTER (OUTPATIENT)
Dept: PHYSICAL THERAPY | Age: 48
Setting detail: THERAPIES SERIES
Discharge: HOME | End: 2023-03-06

## 2023-03-06 NOTE — FLOWSHEET NOTE
[facility])    Physical Therapy  Cancellation/No-show Note  Patient Name:  Vianney Bauer  :  1975   Date:  3/6/2023    Cancelled visits to date: 1 low back initial eval 3/6/2023  No-shows to date: 0    For today's appointment patient:  [x]  Cancelled  []  Rescheduled appointment  []  No-show     Reason given by patient:  [x]  Patient ill  []  Conflicting appointment  []  No transportation    []  Conflict with work  []  No reason given  []  Other:     Comments:      Phone call information:   []  Phone call made today to patient. []  Patient answered, conversation as follows:    []  Patient did not answer. [x]  Phone call not needed - pt contacted us to cancel and provided reason for cancellation.      Electronically signed by:  Ilda Jaffe PT,

## 2023-03-13 ENCOUNTER — TELEPHONE (OUTPATIENT)
Dept: ORTHOPEDIC SURGERY | Age: 48
End: 2023-03-13

## 2023-03-13 ENCOUNTER — HOSPITAL ENCOUNTER (OUTPATIENT)
Dept: PHYSICAL THERAPY | Age: 48
Setting detail: THERAPIES SERIES
Discharge: HOME | End: 2023-03-13
Payer: COMMERCIAL

## 2023-03-13 PROCEDURE — 97161 PT EVAL LOW COMPLEX 20 MIN: CPT

## 2023-03-13 PROCEDURE — 97110 THERAPEUTIC EXERCISES: CPT

## 2023-03-13 NOTE — FLOWSHEET NOTE
[address] [facility] and [address] [place] [address] [address] [facility], [address] [place] [address]  Phone: [phone], [phone]    Physical Therapy Daily Treatment Note  Date:  3/13/2023    Patient Name:  Golden Yen    :  1975  MRN: [id number]  Restrictions/Precautions:    Physician Information:  Damion Dowd,*  Medical/Treatment Diagnosis Information:  Diagnosis: M51.36 Lumbar DDD  Treatment Diagnosis: Lumbar pain M54.5; weakness M62.8   [x] Conservative / [] Surgical - DOS:  Therapy Diagnosis/Practice Pattern:  Practice Pattern F: Spinal Disorders  Insurance/Certification information:  PT Insurance Information: BCBS High cost until ded met 60PT  Plan of care signed: [] YES  [x] NO  Number of Comorbidities:  []0     [x]1-2    []3+  Date of Patient follow up with Physician:     Is this a Progress Report:     []  Yes  [x]  No        If Yes:  Date Range for reporting period:  Beginning 3/13/2023  Ending     Progress report will be due (10 Rx or 30 days whichever is less): 6466       Recertification will be due (POC Duration  / 90 days whichever is less): 2023      Latex Allergy:  [x]NO      []YES  Preferred Language for Healthcare:   [x]English       []other:    Visit # Insurance Allowable   1 60  1 used  auth [x]no        []yes:     SUBJECTIVE:  See eval    OBJECTIVE:   Observation:  Palpation:    Test used Initial score Current Score   VAS     LEFS     flexion     extension     quad     ABD     flexion          RESTRICTIONS/PRECAUTIONS: none    Exercises/Interventions:     Therapeutic Ex/NMR  Sets/reps Notes   Seated glute set 10 x    Seated TA  10 x    Seated march 10 x    Seated LAQ 10 x    Seated hip ADD 10 x    Seated heel toe raises 10 x                                            Pt ed anatomy, PT progression, prognosis 8 min    Manual Intervention     Seated STM to paraspinals 5 min L increased tissue tension compared to R                    Access Code: [email or web address]  URL: [email or web address]  Date: 03/13/2023  Prepared by: Shari Barrio    Exercises  Seated Gluteal Sets - 1-2 x daily - 10 reps  Seated Transversus Abdominis Bracing - 1-2 x daily - 10 reps  Seated March - 1-2 x daily - 10 reps  Seated Long Arc Quad - 1-2 x daily - 10 reps  Seated Hip Adduction Isometrics with Ball - 1-2 x daily - 10 reps  Seated Heel Toe Raises - 1-2 x daily - 10 reps      Therapeutic Exercise and NMR EXR  [x] (57148) Provided verbal/tactile cueing for activities related to strengthening, flexibility, endurance, ROM for improvements in LE, proximal hip, and core control with self care, mobility, lifting, ambulation.  [] (76000) Provided verbal/tactile cueing for activities related to improving balance, coordination, kinesthetic sense, posture, motor skill, proprioception  to assist with LE, proximal hip, and core control in self care, mobility, lifting, ambulation and eccentric single leg control.      NMR and Therapeutic Activities:    [x] (72650 or 03211) Provided verbal/tactile cueing for activities related to improving balance, coordination, kinesthetic sense, posture, motor skill, proprioception and motor activation to allow for proper function of core, proximal hip and LE with self care and ADLs  [] (95993) Gait Re-education- Provided training and instruction to the patient for proper LE, core and proximal hip recruitment and positioning and eccentric body weight control with ambulation re-education including up and down stairs     Home Exercise Program:    [x] (14433) Reviewed/Progressed HEP activities related to strengthening, flexibility, endurance, ROM of core, proximal hip and LE for functional self-care, mobility, lifting and ambulation/stair navigation   [] (27699)Reviewed/Progressed HEP activities related to improving balance, coordination, kinesthetic sense, posture, motor skill, proprioception of core, proximal hip and LE for self care, mobility, lifting, and ambulation/stair navigation      Manual Treatments:  PROM / STM / Oscillations-Mobs:  G-I, II, III, IV (PA's, Inf., Post.)  [x] (31127) Provided manual therapy to mobilize LE, proximal hip and/or LS spine soft tissue/joints for the purpose of modulating pain, promoting relaxation,  increasing ROM, reducing/eliminating soft tissue swelling/inflammation/restriction, improving soft tissue extensibility and allowing for proper ROM for normal function with self care, mobility, lifting and ambulation. Modalities:       [] GR/ESU 15 min    [] GR 15 min  [] ESU     [] CP    [] MHP    [] declined     Charges:  Timed Code Treatment Minutes: 20   Total Treatment Minutes: 35     [x] EVAL (LOW) 95072 (typically 20 minutes face-to-face)  [] EVAL (MOD) 41314 (typically 30 minutes face-to-face)  [] EVAL (HIGH) 04483 (typically 45 minutes face-to-face)  [] RE-EVAL     [x] YN(15151) x 1    [] IONTO  [] NMR (71493) x     [] VASO  [] Manual (20689) x      [] Other:  [] TA x      [] Mech Traction (90371)  [] ES(attended) (84373)      [] ES (un) (42325):     GOALS: Patient stated goal: Pain free, be able to walk upright  [] Progressing: [] Met: [] Not Met: [] Adjusted    Therapist goals for Patient:   Short Term Goals: To be achieved in: 2 weeks  1. Independent in HEP and progression per patient tolerance, in order to prevent re-injury. [] Progressing: [] Met: [] Not Met: [] Adjusted  2. Patient will have a decrease in pain to facilitate improvement in movement, function, and ADLs as indicated by Functional Deficits. [] Progressing: [] Met: [] Not Met: [] Adjusted    Long Term Goals: To be achieved in: 12 weeks  1. Disability index score of 39% or less on LEFS to assist with reaching prior level of function. [] Progressing: [] Met: [] Not Met: [] Adjusted  2. Patient will demonstrate increased AROM to 100 degrees hip flexion to allow for proper joint functioning as indicated by patients Functional Deficits.    [] Progressing: [] Met: [] Not Met: [] Adjusted  3. Patient will demonstrate an increase in Strength to good proximal hip strength and control,at least 4/5 throughout LE to allow for proper functional mobility as indicated by patients Functional Deficits. [] Progressing: [] Met: [] Not Met: [] Adjusted  4. Patient will return to dressing and hygiene ADLs without increased symptoms or restriction. [] Progressing: [] Met: [] Not Met: [] Adjusted  5. Patient will be able to ascend/descend stairs with reciprocal pattern for return to normal ADLs. [] Progressing: [] Met: [] Not Met: [] Adjusted      Overall Progression Towards Functional goals/ Treatment Progress Update:  [] Patient is progressing as expected towards functional goals listed. [] Progression is slowed due to complexities/Impairments listed. [] Progression has been slowed due to co-morbidities. [x] Plan just implemented, too soon to assess goals progression <30days   [] Goals require adjustment due to lack of progress  [] Patient is not progressing as expected and requires additional follow up with physician  [] Other    Prognosis for POC: [x] Good [] Fair  [] Poor      Patient requires continued skilled intervention: [x] Yes  [] No      ASSESSMENT:  See eval    Treatment/Activity Tolerance:  [x] Patient tolerated treatment well [] Patient limited by fatigue  [] Patient limited by pain  [] Patient limited by other medical complications  [] Other:       PLAN: See eval  [] Continue per plan of care [] Alter current plan (see comments above)  [x] Plan of care initiated [] Hold pending MD visit [] Discharge      Electronically signed by:  Kris Javier, PT , DPT [id number]    Note: If patient does not return for scheduled/ recommended follow up visits, this note will serve as a discharge from care along with most recent update on progress.

## 2023-03-13 NOTE — PLAN OF CARE
[address], [address], [place], [address] [facility] [place]  Phone: [phone], [phone]     Physical Therapy Certification    Dear Kiara Paez,*,    We had the pleasure of evaluating the following patient for physical therapy services at [address] [place] [address]. A summary of our findings can be found in the initial assessment below. This includes our plan of care. If you have any questions or concerns regarding these findings, please do not hesitate to contact me at the office phone number checked above. Thank you for the referral.       Physician Signature:_______________________________Date:__________________  By signing above (or electronic signature), therapists plan is approved by physician    Patient: Abhijit Heath   : 1975   MRN: [id number]  Referring Physician: Kiara Paez,*     Evaluation Date: 3/13/2023      Medical Diagnosis Information:  Diagnosis: M51.36 Lumbar DDD   Treatment Diagnosis: Lumbar pain M54.5; weakness M62.8                                         Insurance information: PT Insurance Information: BCBS High cost until ded met 60PT     Precautions/ Contra-indications: none  Latex Allergy:  [x]NO      []YES  Preferred Language for Healthcare:   [x]English       []Other:    C-SSRS Triggered by Intake questionnaire (Past 2 wk assessment):   [x] No, Questionnaire did not trigger screening.   [] Yes, Patient intake triggered further evaluation      [] C-SSRS Screening completed  [] PCP notified via Plan of Care  [] Emergency services notified     SUBJECTIVE: Patient stated complaint: Pt reports all of back and neck and spine hurts. Feels weak. Can't walk without cane or she feels she will fall on her face. States she has to jump through hoops for insurance leading up to anything else being done. Reports her left side sometimes can be worse than the other.  Does have knee problems and history of surgeries. From eval for knee 1/11/2023:  Patient reports L knee pain that started 3-4 years ago and she had a knee scope and menisectomy performed by Dr. Piotr Felder. She had a similar procedure in June 2022, but continued to have a lot of pain. She started using the cane after that surgery and has continued using it. She is off work at [employer] and has been off since September. As of now, she is supposed to go back Jan 26, but she isn't sure if that will happen since she hasn't experienced any changes at this point. She didn't have any changes in her pain, so Dr. Piotr Felder referred her to Dr. Mary Thorne, who noted increased sensitivity over the saphenous nerve distribution and started her on gabapentin on 12/13. She hasn't noticed any changes in the pain so far. She has difficulty standing up straight and is unable to bend forward in a seated position. She has difficulty with all functional transfers and requires assist from daughter for sit>stand. Denies bowel/bladder changes. Relevant Medical History: h/o L knee scope x2 (2018, 2022)  Functional Disability Index: LEFS 39/50 78%     Pain Scale: 3-7/10  Easing factors: rest, avoidance, changing positions   Provocative factors: any movement,      Type: [x]Constant   []Intermittent  []Radiating []Localized []other:     Numbness/Tingling: denies    Occupation/School: works at [address] [place] [address] Level of Function: Independent with ADLs and IADLs    OBJECTIVE:     ROM LEFT RIGHT   HIP Flex 80 75   HIP Abd 5 0   HIP Ext unable unable   Lumbar flex P!!     Lumbar ext P!!               Strength  LEFT RIGHT   HIP Flexors 3- 3-   HIP Abductors 3 3   HIP Ext 3 3                    Reflexes/Sensation: NT   []Dermatomes/Myotomes intact    []Reflexes equal and normal bilaterally   []Other:    Joint mobility: denies   []Normal    []Hypo   []Hyper    Palpation: TTP with increased tissue tension throughout lumbar paraspinals    Functional Mobility/Transfers: independent    Posture: anterior pelvic tilt, genu valgum in single leg stance    Bandages/Dressings/Incisions: healing well no signs of infection    Gait: (include devices/WB status) single point cane on R, wide JANETH, short step length, increased trunk sway    Orthopedic Special Tests: none                       [x] Patient history, allergies, meds reviewed. Medical chart reviewed. See intake form. Review Of Systems (ROS):  [x]Performed Review of systems (Integumentary, CardioPulmonary, Neurological) by intake and observation. Intake form has been scanned into medical record. Patient has been instructed to contact their primary care physician regarding ROS issues if not already being addressed at this time.       Co-morbidities/Complexities (which will affect course of rehabilitation):   []None           Arthritic conditions   []Rheumatoid arthritis (M05.9)  [x]Osteoarthritis (M19.91)   Cardiovascular conditions   [x]Hypertension (I10)  []Hyperlipidemia (E78.5)  []Angina pectoris (I20)  []Atherosclerosis (I70)   Musculoskeletal conditions   []Disc pathology   []Congenital spine pathologies   []Prior surgical intervention  []Osteoporosis (M81.8)  []Osteopenia (M85.8)   Endocrine conditions   []Hypothyroid (E03.9)  []Hyperthyroid Gastrointestinal conditions   []Constipation (O26.29)   Metabolic conditions   [x]Morbid obesity (E66.01)  []Diabetes type 1(E10.65) or 2 (E11.65)   []Neuropathy (G60.9)     Pulmonary conditions   []Asthma (J45)  []Coughing   []COPD (J44.9)   Psychological Disorders  [x]Anxiety (F41.9)  [x]Depression (F32.9)   []Other:   []Other:          Barriers to/and or personal factors that will affect rehab potential:              [x]Age  [x]Sex              [x]Motivation/Lack of Motivation                        [x]Co-Morbidities              []Cognitive Function, education/learning barriers              []Environmental, home barriers              []profession/work barriers  [x]past PT/medical experience  []other:  Justification:     ASSESSMENT:   Functional Impairments:     [x]Noted lumbar/proximal hip/LE joint hypomobility   [x]Decreased LE functional ROM   [x]Decreased core/proximal hip strength and neuromuscular control   [x]Decreased LE functional strength   [x]Reduced balance/proprioceptive control   []other:      Functional Activity Limitations (from functional questionnaire and intake)   [x]Reduced ability to tolerate prolonged functional positions   [x]Reduced ability or difficulty with changes of positions or transfers between positions   [x]Reduced ability to maintain good posture and demonstrate good body mechanics with sitting, bending, and lifting   [x]Reduced ability to sleep   [x] Reduced ability or tolerance with driving and/or computer work   [x]Reduced ability to perform lifting, carrying tasks   [x]Reduced ability to squat   [x]Reduced ability to forward bend   [x]Reduced ability to ambulate prolonged functional periods/distances/surfaces   [x]Reduced ability to ascend/descend stairs   [x]Reduced ability to run, hop, cut or jump   []other:    Participation Restrictions   [x]Reduced participation in self care activities   [x]Reduced participation in home management activities   [x]Reduced participation in work activities   [x]Reduced participation in social activities. [x]Reduced participation in sport/recreation activities. Classification :    []Signs/symptoms consistent with post-surgical status including decreased ROM, strength and function.    []Signs/symptoms consistent with joint sprain/strain   []Signs/symptoms consistent with patella-femoral syndrome   []Signs/symptoms consistent with knee OA/hip OA   []Signs/symptoms consistent with internal derangement of knee/Hip   [x]Signs/symptoms consistent with functional hip weakness/NMR control      []Signs/symptoms consistent with tendinitis/tendinosis    []signs/symptoms consistent with pathology which may benefit from Dry needling      []other:      Prognosis/Rehab Potential:      []Excellent   []Good    [x]Fair   []Poor    Tolerance of evaluation/treatment:    []Excellent   []Good    [x]Fair   []Poor  Physical Therapy Evaluation Complexity Justification  [x] A history of present problem with:  [] no personal factors and/or comorbidities that impact the plan of care;  []1-2 personal factors and/or comorbidities that impact the plan of care  [x]3 personal factors and/or comorbidities that impact the plan of care  [x] An examination of body systems using standardized tests and measures addressing any of the following: body structures and functions (impairments), activity limitations, and/or participation restrictions;:  [] a total of 1-2 or more elements   [] a total of 3 or more elements   [x] a total of 4 or more elements   [x] A clinical presentation with:  [x] stable and/or uncomplicated characteristics   [] evolving clinical presentation with changing characteristics  [] unstable and unpredictable characteristics;   [x] Clinical decision making of [x] low, [] moderate, [] high complexity using standardized patient assessment instrument and/or measurable assessment of functional outcome. [x] EVAL (LOW) 04651 (typically 20 minutes face-to-face)  [] EVAL (MOD) 30477 (typically 30 minutes face-to-face)  [] EVAL (HIGH) 28130 (typically 45 minutes face-to-face)  [] RE-EVAL     PLAN:   Frequency/Duration:  1-2 days per week for 12 Weeks:  Interventions:  [x]  Therapeutic exercise including: strength training, ROM, for Lower extremity and core   [x]  NMR activation and proprioception for LE, Glutes and Core   [x]  Manual therapy as indicated for LE, Hip and spine to include: Dry Needling/IASTM, STM, PROM, Gr I-IV mobilizations, manipulation.    [x] Modalities as needed that may include: thermal agents, E-stim, Biofeedback, US, iontophoresis as indicated  [x] Patient education on joint protection, postural re-education, activity modification, progression of HEP. GOALS:  Patient stated goal: Pain free, be able to walk upright  [] Progressing: [] Met: [] Not Met: [] Adjusted    Therapist goals for Patient:   Short Term Goals: To be achieved in: 2 weeks  1. Independent in HEP and progression per patient tolerance, in order to prevent re-injury. [] Progressing: [] Met: [] Not Met: [] Adjusted  2. Patient will have a decrease in pain to facilitate improvement in movement, function, and ADLs as indicated by Functional Deficits. [] Progressing: [] Met: [] Not Met: [] Adjusted    Long Term Goals: To be achieved in: 12 weeks  1. Disability index score of 39% or less on LEFS to assist with reaching prior level of function. [] Progressing: [] Met: [] Not Met: [] Adjusted  2. Patient will demonstrate increased AROM to 100 degrees hip flexion to allow for proper joint functioning as indicated by patients Functional Deficits. [] Progressing: [] Met: [] Not Met: [] Adjusted  3. Patient will demonstrate an increase in Strength to good proximal hip strength and control,at least 4/5 throughout LE to allow for proper functional mobility as indicated by patients Functional Deficits. [] Progressing: [] Met: [] Not Met: [] Adjusted  4. Patient will return to dressing and hygiene ADLs without increased symptoms or restriction. [] Progressing: [] Met: [] Not Met: [] Adjusted  5. Patient will be able to ascend/descend stairs with reciprocal pattern for return to normal ADLs.    [] Progressing: [] Met: [] Not Met: [] Adjusted      Electronically signed by:  Yolanda Ch DPT, [id number]

## 2023-03-21 ENCOUNTER — HOSPITAL ENCOUNTER (OUTPATIENT)
Dept: PHYSICAL THERAPY | Age: 48
Setting detail: THERAPIES SERIES
Discharge: HOME | End: 2023-03-21
Payer: COMMERCIAL

## 2023-03-21 NOTE — FLOWSHEET NOTE
[facility] ([facility])    Physical Therapy  Cancellation/No-show Note  Patient Name:  Kevin Deleon  :  1975   Date:  3/21/2023    Cancelled visits to date: 1 low back initial eval 3/6/2023  No-shows to date: 0    For today's appointment patient:  [x]  Cancelled  []  Rescheduled appointment  []  No-show     Reason given by patient:  []  Patient ill  []  Conflicting appointment  []  No transportation    []  Conflict with work  []  No reason given  [x]  Other: Too much pain   Comments:      Phone call information:   []  Phone call made today to patient. []  Patient answered, conversation as follows:    []  Patient did not answer. [x]  Phone call not needed - pt contacted us to cancel and provided reason for cancellation.      Electronically signed by:  Claudia Figueroa PT,

## 2023-03-22 ENCOUNTER — TELEPHONE (OUTPATIENT)
Dept: ORTHOPEDIC SURGERY | Age: 48
End: 2023-03-22

## 2023-03-22 ENCOUNTER — OFFICE VISIT (OUTPATIENT)
Dept: ORTHOPEDIC SURGERY | Age: 48
End: 2023-03-22
Payer: COMMERCIAL

## 2023-03-22 VITALS — BODY MASS INDEX: 41.14 KG/M2 | WEIGHT: 225 LBS

## 2023-03-22 DIAGNOSIS — M51.36 DDD (DEGENERATIVE DISC DISEASE), LUMBAR: Primary | ICD-10-CM

## 2023-03-22 PROCEDURE — 99214 OFFICE O/P EST MOD 30 MIN: CPT | Performed by: PHYSICIAN ASSISTANT

## 2023-03-22 NOTE — LETTER
March 22, 2023       Kia Mao YOB: 1975   Joss Date of Visit:  3/22/2023       To Whom It May Concern: It is my medical opinion that Jose Luis Connor should remain out of work until April 22, 2023. If you have any questions or concerns, please don't hesitate to call.     Sincerely,        Juvencio Calixto PA-C

## 2023-03-22 NOTE — PROGRESS NOTES
injury. Range of motion is unremarkable. There is no gross instability. There are no rashes, ulcerations or lesions. Strength and tone are normal.  LEFT LOWER EXTREMITY:  Inspection/examination of the left lower extremity does not show any tenderness, deformity or injury. Range of motion is unremarkable. There is no gross instability. There are no rashes, ulcerations or lesions. Strength and tone are normal.    Diagnostic Testing:  X-rays: X-rays of the cervical spine that were obtained on 1/17/2023 was independently reviewed with the patient which shows DDD of the cervical spine with multilevel disc space narrowing with anterior osteophyte formation. There is flattening of the cervical lordosis. X-rays of the lumbar spine that were obtained on 1/17/2023 were independently reviewed with the patient which shows well-maintained lumbar lordosis with disc space narrowing mainly at L3-4 and L4-5. MRI of the cervical spine that was obtained on 2/13/2023 shows multilevel degenerative changes with severe foraminal stenosis on the right at C3-4 and C4-5. There is moderate foraminal stenosis bilaterally at C2-3 and on the right at C5-6. There is moderate central canal stenosis at C4-5. She has mild foraminal stenosis on the left at C3-4 and on the left that 4 5. Mild central canal stenosis at C3-4, C5-6, C6-7. There is no cord signal abnormality seen. EMG of both the upper and lower extremities that was obtained on 2/16/2023 shows no evidence of active left cervical radiculopathy, peripheral neuropathy, plexopathy, myopathy or motor neuron disease. There is no evidence of left lower extremity peripheral neuropathy, plexopathy, myopathy or motor neuron disease noted. There is no evidence of right or left carpal tunnel syndrome. There is no evidence of upper extremity peripheral neuropathy noted.     MRI of the lumbar spine that was obtained on 3/8/2023 was independently reviewed with the patient which shows

## 2023-03-28 ENCOUNTER — HOSPITAL ENCOUNTER (OUTPATIENT)
Dept: PHYSICAL THERAPY | Age: 48
Setting detail: THERAPIES SERIES
Discharge: HOME | End: 2023-03-28
Payer: COMMERCIAL

## 2023-03-28 PROCEDURE — 97110 THERAPEUTIC EXERCISES: CPT

## 2023-03-28 NOTE — FLOWSHEET NOTE
[id number]    Note: If patient does not return for scheduled/ recommended follow up visits, this note will serve as a discharge from care along with most recent update on progress.

## 2023-04-04 ENCOUNTER — HOSPITAL ENCOUNTER (OUTPATIENT)
Dept: PHYSICAL THERAPY | Age: 48
Setting detail: THERAPIES SERIES
Discharge: HOME | End: 2023-04-04

## 2023-04-04 NOTE — FLOWSHEET NOTE
[facility], [facility] ([facility])    Physical Therapy  Cancellation/No-show Note  Patient Name:  Aydin Sanchez  :  1975   Date:  2023    Cancelled visits to date: 1 low back initial eval 3/6/2023  No-shows to date: 1    For today's appointment patient:  []  Cancelled  []  Rescheduled appointment  [x]  No-show     Reason given by patient:  []  Patient ill  []  Conflicting appointment  []  No transportation    []  Conflict with work  []  No reason given  [x]  Other:   Comments:  Family emergency    Phone call information:   [x]  Phone call made today to patient. []  Patient answered, conversation as follows: family emergency   []  Patient did not answer. []  Phone call not needed - pt contacted us to cancel and provided reason for cancellation.      Electronically signed by:  Williams Cook PTA,

## 2023-04-05 ENCOUNTER — TELEPHONE (OUTPATIENT)
Dept: ORTHOPEDIC SURGERY | Age: 48
End: 2023-04-05

## 2023-04-05 NOTE — TELEPHONE ENCOUNTER
Faxed updated ADA to [facility] @ [phone]. Any return to work restrictions will need to come from Dr. Sienna Nunez office per Grayson Bush.     Case # [id number]

## 2023-04-05 NOTE — TELEPHONE ENCOUNTER
Working on updating the [address] [facility]. Waiting for a reply from clinic regarding return to work restrictions.

## 2023-04-18 ENCOUNTER — HOSPITAL ENCOUNTER (OUTPATIENT)
Dept: PHYSICAL THERAPY | Age: 48
Setting detail: THERAPIES SERIES
Discharge: HOME | End: 2023-04-18

## 2023-04-18 NOTE — FLOWSHEET NOTE
[facility] ([facility])    Physical Therapy  Cancellation/No-show Note  Patient Name:  Digna Pena  :  1975   Date:  2023    Cancelled visits to date: 1 low back initial eval 3/6/2023  No-shows to date: 1    For today's appointment patient:  [x]  Cancelled  []  Rescheduled appointment  []  No-show     Reason given by patient:  [x]  Patient ill - in hospital   []  Conflicting appointment  []  No transportation    []  Conflict with work  []  No reason given  []  Other:       Phone call information:   []  Phone call made today to patient. []  Patient answered, conversation as follows: family emergency   []  Patient did not answer. [x]  Phone call not needed - pt contacted us to cancel and provided reason for cancellation.      Electronically signed by:  Leydi Beach PTA,

## 2023-05-04 ENCOUNTER — HOSPITAL ENCOUNTER (OUTPATIENT)
Dept: PHYSICAL THERAPY | Age: 48
Setting detail: THERAPIES SERIES
Discharge: HOME | End: 2023-05-04

## 2023-05-04 NOTE — FLOWSHEET NOTE
[address], [facility] ([facility])    Physical Therapy  Cancellation/No-show Note  Patient Name:  Julio Glynn  :  1975   Date:  2023    Cancelled visits to date: 1 low back initial eval 3/6/2023  No-shows to date: 1    For today's appointment patient:  []  Cancelled  []  Rescheduled appointment  [x]  No-show     Reason given by patient:  []  Patient ill - in hospital   []  Conflicting appointment  []  No transportation    []  Conflict with work  []  No reason given  []  Other:       Phone call information:   []  Phone call made today to patient. []  Patient answered, conversation as follows: family emergency   []  Patient did not answer.   [x]  Phone call not needed - pt has cancelled or no showed last 3 visits - will D/C chart at this time    Electronically signed by:  Michael Sims PTA,

## 2023-05-28 DIAGNOSIS — M17.12 PRIMARY OSTEOARTHRITIS OF LEFT KNEE: ICD-10-CM

## 2023-05-28 DIAGNOSIS — M25.562 ACUTE PAIN OF LEFT KNEE: ICD-10-CM

## 2023-05-30 RX ORDER — GABAPENTIN 300 MG/1
CAPSULE ORAL
Qty: 30 CAPSULE | Refills: 0 | Status: SHIPPED | OUTPATIENT
Start: 2023-05-30 | End: 2023-06-29

## 2023-07-16 DIAGNOSIS — M17.12 PRIMARY OSTEOARTHRITIS OF LEFT KNEE: ICD-10-CM

## 2023-07-16 DIAGNOSIS — M25.562 ACUTE PAIN OF LEFT KNEE: ICD-10-CM

## 2023-07-17 RX ORDER — GABAPENTIN 300 MG/1
CAPSULE ORAL
Qty: 30 CAPSULE | OUTPATIENT
Start: 2023-07-17 | End: 2023-08-16

## 2023-07-17 NOTE — TELEPHONE ENCOUNTER
Patient has to be seen every 3 months if she is to continue with gabapentin. Refill was denied at this time since she was last seen in March.

## (undated) DEVICE — GLOVE SURG SZ 85 L12IN FNGR THK94MIL STD WHT ISOLEX LTX

## (undated) DEVICE — MANIFOLD SURG NEPTUNE WST MGMT

## (undated) DEVICE — 4.5 MM INCISOR PLUS STRAIGHT                                    BLADES, POWER/EP-1, VIOLET, PACKAGED                                    6 PER BOX, STERILE

## (undated) DEVICE — BLADE SHV L13CM DIA4MM EXCALIBUR AGG COOLCUT

## (undated) DEVICE — PACK PROCEDURE SURG SURGERYARTHROSCOPY KNEE

## (undated) DEVICE — SUTURE ETHLN SZ 4-0 L18IN NONABSORBABLE BLK L19MM PS-2 3/8 1667H

## (undated) DEVICE — WEREWOLF FLOW 90 COBLATION WAND: Brand: COBLATION

## (undated) DEVICE — GOWN,SIRUS,NONRNF,3XL,18/CS: Brand: MEDLINE

## (undated) DEVICE — Z INACTIVE NO SUPPLIER SOLUTIONIRRIG 3000ML 0.9% SOD CHL FLX CONT [79720808] [HOSPIRA WORLDWIDE INC]